# Patient Record
Sex: FEMALE | Race: WHITE | HISPANIC OR LATINO
[De-identification: names, ages, dates, MRNs, and addresses within clinical notes are randomized per-mention and may not be internally consistent; named-entity substitution may affect disease eponyms.]

---

## 2019-10-04 ENCOUNTER — APPOINTMENT (OUTPATIENT)
Dept: ENDOCRINOLOGY | Facility: CLINIC | Age: 69
End: 2019-10-04
Payer: MEDICARE

## 2019-10-04 VITALS
HEIGHT: 59 IN | BODY MASS INDEX: 29.03 KG/M2 | RESPIRATION RATE: 16 BRPM | WEIGHT: 144 LBS | DIASTOLIC BLOOD PRESSURE: 75 MMHG | HEART RATE: 96 BPM | OXYGEN SATURATION: 95 % | SYSTOLIC BLOOD PRESSURE: 123 MMHG

## 2019-10-04 LAB
GLUCOSE BLDC GLUCOMTR-MCNC: 405
HBA1C MFR BLD HPLC: >14

## 2019-10-04 PROCEDURE — 99204 OFFICE O/P NEW MOD 45 MIN: CPT | Mod: 25

## 2019-10-04 PROCEDURE — 83036 HEMOGLOBIN GLYCOSYLATED A1C: CPT | Mod: QW

## 2019-10-04 PROCEDURE — 82962 GLUCOSE BLOOD TEST: CPT

## 2019-10-04 NOTE — REVIEW OF SYSTEMS
[Fatigue] : fatigue [Visual Field Defect] : no visual field defect [Decreased Appetite] : appetite not decreased [Blurry Vision] : no blurred vision [Dysphagia] : no dysphagia [Dry Eyes] : no dryness of the eyes [Chest Pain] : no chest pain [Dysphonia] : no dysphonia [Palpitations] : no palpitations [Shortness Of Breath] : no shortness of breath [Wheezing] : no wheezing was heard [Cough] : no cough [Nausea] : no nausea [Vomiting] : no vomiting was observed [Constipation] : no constipation [Polyuria] : polyuria [Dysuria] : no dysuria [Incontinence] : no incontinence [Joint Pain] : no joint pain [Joint Stiffness] : no joint stiffness [Muscle Weakness] : no muscle weakness [Muscle Cramps] : no muscle cramps [Hair Loss] : no hair loss [Dry Skin] : no dry skin [Tremors] : no tremors [Headache] : no headaches [Dizziness] : no dizziness [Depression] : no depression [Anxiety] : no anxiety [Polydipsia] : polydipsia [Heat Intolerance] : heat tolerant [Cold Intolerance] : cold tolerant [Swelling] : no swelling [Lymphadenopathy] : no lymphadenopathy

## 2019-10-04 NOTE — HISTORY OF PRESENT ILLNESS
[FreeTextEntry1] : 69 year old female with PMH of type 2 DM (dx ) complicated by neuropathy, hypothyroidism, HTN, and HLD was referred for evaluation of uncontrolled type 2 DM. \par \par She reports weight loss of 100 lbs over the last 1.5 years secondary to dietary modifications. She was previously on insulin which was discontinued due to episodes of severe hypoglycemia following the weight loss. \par \par POC A1c in office>14%. \par \par Patient reports feeling well overall. Patient reports polyuria, polydipsia, and fatigue. She denies shortness of breath or lower extremity edema.\par \par Current DM regimen:\par Glipizide 5mg daily\par Xigduo 10mg,1000mg daily (started 1 year ago)\par \par SMBG: checks 2 times daily (fasting and bedtime). as per patient memory\par fastin-322\par bedtime: 400s\par \par FS in office: 400\par \par Hypoglycemia none \par Has hypoglycemic awareness none \par \par Diet:\par Breakfast: toast and coffee with sugar \par Lunch: vegetable shake, sometimes skips lunch, fruit smoothies with added sugar \par Dinner: vegetable and bean soup with bread or rice \par snacks: none  \par \par Exercise: walks daily\par \par Ophthalmology: last visit 10/2/19. no DR as per patient. \par Podiatry: + neuropathy, last visit 2 days ago\par Microalb: + 39, on lisinopril \par LDL on atorvastatin 20mg daily\par \par Family history: paternal uncles- DM\par \par On levothyroxine 100mcg PO daily for hypothyroidism. Recent TSH in 2019 2.3. \par \par Result of recent blood work 2019 reviewed:\par LDL 90\par TSH 2.3\par A1c >15.5%\par micro alb 38\par eGFR 72\par cr 0.82\par

## 2019-10-04 NOTE — CONSULT LETTER
[Dear  ___] : Dear  [unfilled], [FreeTextEntry1] : \par I had the pleasure of seeing your patient, LUDY HERNANDEZ, in consultation today for type 2 DM.\par I am including my consult note for your review. If I can be of any assistance, please do not hesitate to contact me. Thank you for allowing me to participate in the care of your patient.\par  \par Sincerely, \par \par Deanna Cheema M.D.\par \par

## 2019-10-04 NOTE — ASSESSMENT
[FreeTextEntry1] : 1. Uncontrolled type 2 DM with hyperglycemia\par -POC A1c >14%\par -Up to date with ophthalmology and podiatry follow up\par -microalbuminuria, on lisinopril\par -BP at goal \par -patient referred to diabetic educator and nutrition\par -change DM regimen as follows:\par       -Start Basaglar 12 units at bedtime \par       -Start Humalog 5 units TID with meals \par       -D/C glipizide 5mg daily\par       -Continue Xigduo\par -patient agreed to bring FS log to next visit \par 		---The following has been discussed:---\par -Targets for weight and HgA1c have been discussed with patient \par -FS goals have been reviewed with the patient in detail:\par AM <130 post meal<160-180\par -Diet and weight goals have been discussed with the patient in detail.\par -The importance of exercise in the treatment of diabetes has been discussed \par with the patient in detail.\par -Extensive dietary advice provided to patient and the need to avoid concentrated \par sweets/simple carbohydrates and to ensure to consume protein with each meal. \par -Patient instructed to limit carbohydrates to 60 gms per meal and 15 gms per \par snacks. \par -Patient to keep a blood sugar log to check fasting and before meals\par -Patient instructed on importance of daily feet inspection and to reports any \par open lesions to physician promptly\par \par \par 2. HLD\par -recent LDL 90\par -continue statin\par -counseled on low fat diet \par \par 3. HTN\par -BP at goal\par -continue lisinopril\par \par 4. Hypothyroidism\par -clinically and biochemically euthyroid\par -continue Levothyroxine 100mcg PO daily\par

## 2019-10-04 NOTE — PHYSICAL EXAM
[Alert] : alert [No Acute Distress] : no acute distress [Well Nourished] : well nourished [Well Developed] : well developed [Normal Sclera/Conjunctiva] : normal sclera/conjunctiva [EOMI] : extra ocular movement intact [No Proptosis] : no proptosis [Thyroid Not Enlarged] : the thyroid was not enlarged [Normal Oropharynx] : the oropharynx was normal [No Respiratory Distress] : no respiratory distress [No Thyroid Nodules] : there were no palpable thyroid nodules [No Accessory Muscle Use] : no accessory muscle use [Clear to Auscultation] : lungs were clear to auscultation bilaterally [Normal Rate] : heart rate was normal  [Normal S1, S2] : normal S1 and S2 [Regular Rhythm] : with a regular rhythm [Pedal Pulses Normal] : the pedal pulses are present [No Edema] : there was no peripheral edema [Normal Bowel Sounds] : normal bowel sounds [Not Tender] : non-tender [Soft] : abdomen soft [Not Distended] : not distended [No Spinal Tenderness] : no spinal tenderness [Spine Straight] : spine straight [No Stigmata of Cushings Syndrome] : no stigmata of cushings syndrome [Normal Gait] : normal gait [Normal Strength/Tone] : muscle strength and tone were normal [Acanthosis Nigricans] : no acanthosis nigricans [No Rash] : no rash [Right Foot Was Examined] : right foot ~C was examined [Left Foot Was Examined] : left foot ~C was examined [Normal] : normal [Normal Reflexes] : deep tendon reflexes were 2+ and symmetric [Diminished Throughout Both Feet] : normal tactile sensation with monofilament testing throughout both feet [No Tremors] : no tremors [Oriented x3] : oriented to person, place, and time

## 2019-11-07 ENCOUNTER — APPOINTMENT (OUTPATIENT)
Dept: ENDOCRINOLOGY | Facility: CLINIC | Age: 69
End: 2019-11-07

## 2021-04-12 ENCOUNTER — NON-APPOINTMENT (OUTPATIENT)
Age: 71
End: 2021-04-12

## 2021-04-12 ENCOUNTER — APPOINTMENT (OUTPATIENT)
Dept: CARDIOLOGY | Facility: CLINIC | Age: 71
End: 2021-04-12
Payer: MEDICARE

## 2021-04-12 VITALS
DIASTOLIC BLOOD PRESSURE: 80 MMHG | HEART RATE: 92 BPM | TEMPERATURE: 96.9 F | BODY MASS INDEX: 31.04 KG/M2 | HEIGHT: 59 IN | WEIGHT: 154 LBS | SYSTOLIC BLOOD PRESSURE: 137 MMHG | OXYGEN SATURATION: 94 %

## 2021-04-12 DIAGNOSIS — R00.2 PALPITATIONS: ICD-10-CM

## 2021-04-12 PROCEDURE — 93000 ELECTROCARDIOGRAM COMPLETE: CPT

## 2021-04-12 PROCEDURE — 99072 ADDL SUPL MATRL&STAF TM PHE: CPT

## 2021-04-12 PROCEDURE — 99204 OFFICE O/P NEW MOD 45 MIN: CPT

## 2021-04-12 RX ORDER — ALBUTEROL SULFATE 4 MG/1
TABLET ORAL
Refills: 0 | Status: DISCONTINUED | COMMUNITY
End: 2021-04-12

## 2021-04-12 RX ORDER — PENTOXIFYLLINE 400 MG/1
400 TABLET, EXTENDED RELEASE ORAL
Refills: 0 | Status: DISCONTINUED | COMMUNITY
End: 2021-04-12

## 2021-04-12 RX ORDER — DAPAGLIFLOZIN AND METFORMIN HYDROCHLORIDE 10; 1000 MG/1; MG/1
10-1000 TABLET, FILM COATED, EXTENDED RELEASE ORAL
Refills: 0 | Status: DISCONTINUED | COMMUNITY
End: 2021-04-12

## 2021-04-12 RX ORDER — GLIPIZIDE 5 MG/1
5 TABLET ORAL
Refills: 0 | Status: DISCONTINUED | COMMUNITY
End: 2021-04-12

## 2021-04-12 RX ORDER — AZITHROMYCIN 250 MG/1
250 TABLET, FILM COATED ORAL
Qty: 6 | Refills: 0 | Status: DISCONTINUED | COMMUNITY
Start: 2021-02-04 | End: 2021-04-12

## 2021-04-12 NOTE — PHYSICAL EXAM
[General Appearance - Well Developed] : well developed [Normal Appearance] : normal appearance [Well Groomed] : well groomed [General Appearance - Well Nourished] : well nourished [No Deformities] : no deformities [General Appearance - In No Acute Distress] : no acute distress [Normal Conjunctiva] : the conjunctiva exhibited no abnormalities [Eyelids - No Xanthelasma] : the eyelids demonstrated no xanthelasmas [Normal Oral Mucosa] : normal oral mucosa [No Oral Pallor] : no oral pallor [No Oral Cyanosis] : no oral cyanosis [Normal Jugular Venous A Waves Present] : normal jugular venous A waves present [Normal Jugular Venous V Waves Present] : normal jugular venous V waves present [No Jugular Venous Prescott A Waves] : no jugular venous prescott A waves [Heart Rate And Rhythm] : heart rate and rhythm were normal [Heart Sounds] : normal S1 and S2 [Murmurs] : no murmurs present [Respiration, Rhythm And Depth] : normal respiratory rhythm and effort [Exaggerated Use Of Accessory Muscles For Inspiration] : no accessory muscle use [Auscultation Breath Sounds / Voice Sounds] : lungs were clear to auscultation bilaterally [Abdomen Soft] : soft [Abdomen Tenderness] : non-tender [Abdomen Mass (___ Cm)] : no abdominal mass palpated [Abnormal Walk] : normal gait [Gait - Sufficient For Exercise Testing] : the gait was sufficient for exercise testing [Nail Clubbing] : no clubbing of the fingernails [Cyanosis, Localized] : no localized cyanosis [Petechial Hemorrhages (___cm)] : no petechial hemorrhages [Skin Color & Pigmentation] : normal skin color and pigmentation [] : no rash [No Venous Stasis] : no venous stasis [Skin Lesions] : no skin lesions [No Skin Ulcers] : no skin ulcer [No Xanthoma] : no  xanthoma was observed [Oriented To Time, Place, And Person] : oriented to person, place, and time [Affect] : the affect was normal [Mood] : the mood was normal [No Anxiety] : not feeling anxious

## 2021-04-12 NOTE — HISTORY OF PRESENT ILLNESS
[FreeTextEntry1] : Shruthi is a 70-year-old Lebanese speaking female DM, htn, hyperlipidemia, s/p COVID with dyspnea and palpitations on minimal exertion that has not improved. She is unable to do normal housework.

## 2021-04-12 NOTE — REVIEW OF SYSTEMS
[Shortness Of Breath] : shortness of breath [Dyspnea on exertion] : dyspnea during exertion [Palpitations] : palpitations [Negative] : Heme/Lymph [Chest Pain] : no chest pain [Lower Ext Edema] : no extremity edema

## 2021-04-12 NOTE — DISCUSSION/SUMMARY
[FreeTextEntry1] : The patient is a 70-year-old Cam[place]onian speaking female DM, hypertension, hyperlipidemia, s/p COVID with dyspnea and palpitations. \par #1 CV- normal ECG, echo and event monitor\par #2 Htn- continue diltiazem, lisinopril/HCTZ\par #3 Lipids- continue atorvastatin and fenofibrate\par #4 DM- continue januvia, and metformin\par #5 Hypothyroid- continue levothyroxine\par #6 General- encouraged to walk daily and use inspirometer, daughter supportive

## 2021-04-22 ENCOUNTER — APPOINTMENT (OUTPATIENT)
Dept: INTERNAL MEDICINE | Facility: CLINIC | Age: 71
End: 2021-04-22
Payer: MEDICARE

## 2021-04-22 VITALS
BODY MASS INDEX: 31.04 KG/M2 | DIASTOLIC BLOOD PRESSURE: 87 MMHG | TEMPERATURE: 96.4 F | HEART RATE: 101 BPM | HEIGHT: 59 IN | SYSTOLIC BLOOD PRESSURE: 152 MMHG | WEIGHT: 154 LBS | OXYGEN SATURATION: 94 % | RESPIRATION RATE: 16 BRPM

## 2021-04-22 PROCEDURE — 99072 ADDL SUPL MATRL&STAF TM PHE: CPT

## 2021-04-22 PROCEDURE — 99387 INIT PM E/M NEW PAT 65+ YRS: CPT

## 2021-04-22 PROCEDURE — 99213 OFFICE O/P EST LOW 20 MIN: CPT

## 2021-04-22 NOTE — ASSESSMENT
[FreeTextEntry1] : INITIAL CPE OF 70 Y OLD FEM WITH PMX OF DM ,DYSLIPIDEMIA ,HTN ,ASTHMA AND HYPOTHYROID= REFER TO SEE ENDO FOR POORLY CONTROL DM ;ADVISED TO RESUME 5 UNITS PREMEAL SHORT ACTING INSULIN TILL SEEN BY ENDO \par REFER TO SEE PULM FOR POST COVID-19 DYSPNEA\par RTO IN 3-4 M \par F/U CARDIOLOGY

## 2021-04-22 NOTE — PHYSICAL EXAM
[No Acute Distress] : no acute distress [Well Nourished] : well nourished [Well Developed] : well developed [Well-Appearing] : well-appearing [Normal Sclera/Conjunctiva] : normal sclera/conjunctiva [PERRL] : pupils equal round and reactive to light [EOMI] : extraocular movements intact [No JVD] : no jugular venous distention [No Lymphadenopathy] : no lymphadenopathy [Supple] : supple [Thyroid Normal, No Nodules] : the thyroid was normal and there were no nodules present [No Respiratory Distress] : no respiratory distress  [No Accessory Muscle Use] : no accessory muscle use [Decreased Breath Sounds] : breath sounds were decreased diffusely [Normal Rate] : normal rate  [Regular Rhythm] : with a regular rhythm [Normal S1, S2] : normal S1 and S2 [No Murmur] : no murmur heard [No Carotid Bruits] : no carotid bruits [No Abdominal Bruit] : a ~M bruit was not heard ~T in the abdomen [No Varicosities] : no varicosities [Pedal Pulses Present] : the pedal pulses are present [No Edema] : there was no peripheral edema [No Palpable Aorta] : no palpable aorta [No Extremity Clubbing/Cyanosis] : no extremity clubbing/cyanosis [Soft] : abdomen soft [Non Tender] : non-tender [Non-distended] : non-distended [No Masses] : no abdominal mass palpated [No HSM] : no HSM [Normal Bowel Sounds] : normal bowel sounds [Obese] : obese [Normal Posterior Cervical Nodes] : no posterior cervical lymphadenopathy [Normal Anterior Cervical Nodes] : no anterior cervical lymphadenopathy [No CVA Tenderness] : no CVA  tenderness [No Spinal Tenderness] : no spinal tenderness [No Joint Swelling] : no joint swelling [Grossly Normal Strength/Tone] : grossly normal strength/tone [No Rash] : no rash [Coordination Grossly Intact] : coordination grossly intact [No Focal Deficits] : no focal deficits [Normal Affect] : the affect was normal [Normal Insight/Judgement] : insight and judgment were intact

## 2021-04-22 NOTE — REVIEW OF SYSTEMS
[Shortness Of Breath] : shortness of breath [Dyspnea on Exertion] : dyspnea on exertion [Joint Pain] : joint pain [Joint Stiffness] : joint stiffness [Muscle Pain] : muscle pain [Negative] : Heme/Lymph

## 2021-04-23 ENCOUNTER — APPOINTMENT (OUTPATIENT)
Dept: RHEUMATOLOGY | Facility: CLINIC | Age: 71
End: 2021-04-23
Payer: MEDICARE

## 2021-04-23 VITALS
DIASTOLIC BLOOD PRESSURE: 76 MMHG | BODY MASS INDEX: 31.25 KG/M2 | SYSTOLIC BLOOD PRESSURE: 132 MMHG | HEIGHT: 59 IN | WEIGHT: 155 LBS | RESPIRATION RATE: 17 BRPM | OXYGEN SATURATION: 95 % | TEMPERATURE: 96.7 F | HEART RATE: 90 BPM

## 2021-04-23 PROCEDURE — 99072 ADDL SUPL MATRL&STAF TM PHE: CPT

## 2021-04-23 PROCEDURE — 99205 OFFICE O/P NEW HI 60 MIN: CPT

## 2021-05-11 ENCOUNTER — APPOINTMENT (OUTPATIENT)
Dept: ENDOCRINOLOGY | Facility: CLINIC | Age: 71
End: 2021-05-11
Payer: MEDICARE

## 2021-05-11 VITALS — BODY MASS INDEX: 30.84 KG/M2 | HEIGHT: 59 IN | WEIGHT: 153 LBS

## 2021-05-11 PROCEDURE — 99072 ADDL SUPL MATRL&STAF TM PHE: CPT

## 2021-05-11 PROCEDURE — 95251 CONT GLUC MNTR ANALYSIS I&R: CPT

## 2021-05-11 PROCEDURE — G0108 DIAB MANAGE TRN  PER INDIV: CPT

## 2021-05-11 PROCEDURE — 95250 CONT GLUC MNTR PHYS/QHP EQP: CPT

## 2021-05-24 ENCOUNTER — APPOINTMENT (OUTPATIENT)
Dept: PULMONOLOGY | Facility: CLINIC | Age: 71
End: 2021-05-24
Payer: MEDICARE

## 2021-05-24 ENCOUNTER — APPOINTMENT (OUTPATIENT)
Dept: CARDIOLOGY | Facility: CLINIC | Age: 71
End: 2021-05-24
Payer: MEDICARE

## 2021-05-24 VITALS
HEART RATE: 85 BPM | TEMPERATURE: 97.1 F | DIASTOLIC BLOOD PRESSURE: 80 MMHG | BODY MASS INDEX: 31.25 KG/M2 | SYSTOLIC BLOOD PRESSURE: 130 MMHG | OXYGEN SATURATION: 95 % | HEIGHT: 59 IN | RESPIRATION RATE: 12 BRPM | WEIGHT: 155 LBS

## 2021-05-24 PROCEDURE — 93306 TTE W/DOPPLER COMPLETE: CPT

## 2021-05-24 PROCEDURE — 99204 OFFICE O/P NEW MOD 45 MIN: CPT

## 2021-05-24 NOTE — HISTORY OF PRESENT ILLNESS
[Never] : never [Former] : former [TextBox_4] : Patient is a 71-year-old obese female with past medical history significant for asthma, diabetes status post COVID-19 pneumonia who presents for pulmonary consult.  The patient was never admitted to the hospital.  She currently complains of cough shortness of breath and dyspnea on exertion despite using her medications.  In discussion with the patient's daughter the patient has been using her maintenance medicine as a rescue inhaler

## 2021-05-24 NOTE — REASON FOR VISIT
[Consultation] : a consultation [Asthma] : asthma [Cough] : cough [Shortness of Breath] : shortness of breath [Family Member] : family member [TextBox_44] : Status post COVID-19 pneumonia

## 2021-05-24 NOTE — ASSESSMENT
[FreeTextEntry1] : Summary the patient is a 71-year-old female with past medical history significant for diabetes, asthma status post COVID-19 pneumonia who presents today for pulmonary consult.  The patient's physical exam is significant for good air entry bilaterally.  I had a lengthy discussion with the patient and her daughter.  The patient has been using her maintenance medication and properly.  I instructed her and her daughter on the proper use of her maintenance and rescue medications.  Prescriptions have been sent.  An interleukin-6 level as well as a D-dimer are also being sent.  If the patient's D-dimer is positive I will start her on anticoagulation.  In the meantime a pulmonary function test as well as a cardiopulmonary stress test have been ordered.  The patient will follow up in 2 weeks

## 2021-05-24 NOTE — REVIEW OF SYSTEMS
[Fever] : no fever [Fatigue] : fatigue [Chills] : no chills [Cough] : cough [Hemoptysis] : no hemoptysis [Chest Tightness] : chest tightness [Frequent URIs] : no frequent URIs [Sputum] : no sputum [Dyspnea] : dyspnea [Pleuritic Pain] : no pleuritic pain [Wheezing] : wheezing [A.M. Dry Mouth] : a.m. dry mouth [SOB on Exertion] : sob on exertion [Negative] : Endocrine

## 2021-05-27 LAB
COVID-19 NUCLEOCAPSID  GAM ANTIBODY INTERPRETATION: POSITIVE
IL6 SERPL-MCNC: <2.5 PG/ML
SARS-COV-2 AB SERPL QL IA: 187 INDEX

## 2021-06-07 ENCOUNTER — APPOINTMENT (OUTPATIENT)
Dept: PULMONOLOGY | Facility: CLINIC | Age: 71
End: 2021-06-07
Payer: MEDICARE

## 2021-06-07 VITALS
HEIGHT: 59 IN | SYSTOLIC BLOOD PRESSURE: 143 MMHG | BODY MASS INDEX: 31.85 KG/M2 | HEART RATE: 74 BPM | DIASTOLIC BLOOD PRESSURE: 84 MMHG | WEIGHT: 158 LBS | TEMPERATURE: 97.1 F | OXYGEN SATURATION: 95 % | RESPIRATION RATE: 14 BRPM

## 2021-06-07 DIAGNOSIS — R06.83 SNORING: ICD-10-CM

## 2021-06-07 PROCEDURE — 94060 EVALUATION OF WHEEZING: CPT

## 2021-06-07 PROCEDURE — 94729 DIFFUSING CAPACITY: CPT

## 2021-06-07 PROCEDURE — 99214 OFFICE O/P EST MOD 30 MIN: CPT | Mod: 25

## 2021-06-07 PROCEDURE — 94726 PLETHYSMOGRAPHY LUNG VOLUMES: CPT

## 2021-06-07 NOTE — REASON FOR VISIT
[Follow-Up] : a follow-up visit [Shortness of Breath] : shortness of breath [Family Member] : family member

## 2021-06-07 NOTE — PHYSICAL EXAM
[No Acute Distress] : no acute distress [Normal Oropharynx] : normal oropharynx [III] : Mallampati Class: III [Normal Appearance] : normal appearance [No Neck Mass] : no neck mass [Normal Rate/Rhythm] : normal rate/rhythm [Normal S1, S2] : normal s1, s2 [No Murmurs] : no murmurs [No Resp Distress] : no resp distress [Clear to Auscultation Bilaterally] : clear to auscultation bilaterally [Benign] : benign [No Abnormalities] : no abnormalities [Normal Gait] : normal gait [No Clubbing] : no clubbing [No Cyanosis] : no cyanosis [No Edema] : no edema [FROM] : FROM [Normal Color/ Pigmentation] : normal color/ pigmentation [No Focal Deficits] : no focal deficits [Oriented x3] : oriented x3 [Normal Affect] : normal affect

## 2021-06-07 NOTE — HISTORY OF PRESENT ILLNESS
[Former] : former [Never] : never [TextBox_4] : Patient is a 71-year-old obese female with past medical history significant for post COVID-19 pneumonia, history of asthma, hypertension, high cholesterol, obesity, diabetes who presents today for further management.  The patient complains of cough shortness of breath and dyspnea on exertion.  She states that she is winded after minimal movements and/or exercise.  She also complains of nonrestorative sleep and morning headaches.  She denies fevers chills chest pain weight loss or hemoptysis

## 2021-06-07 NOTE — REVIEW OF SYSTEMS
[Fever] : no fever [Fatigue] : fatigue [Chills] : no chills [Cough] : cough [Hemoptysis] : no hemoptysis [Chest Tightness] : chest tightness [Frequent URIs] : no frequent URIs [Sputum] : no sputum [Dyspnea] : dyspnea [Pleuritic Pain] : no pleuritic pain [Wheezing] : no wheezing [A.M. Dry Mouth] : a.m. dry mouth [SOB on Exertion] : sob on exertion [Negative] : Endocrine

## 2021-06-07 NOTE — ASSESSMENT
[FreeTextEntry1] : In summary the patient is a 71-year-old obese female with past medical history significant for hypertension, high cholesterol, diabetes, status post COVID-19 pneumonia, hypothyroidism who presents today for follow-up.  The patient's history and physical is consistent with restrictive lung disease and obstructive sleep apnea.  Her physical exam is significant for Mallampati score of 3.  The patient underwent a pulmonary function test which revealed severe restrictive lung disease.  A home sleep monitor has been ordered.\par \par I had a lengthy discussion with the patient and her family member.  The patient will attempt a diet and will follow-up in 3 months

## 2021-06-30 ENCOUNTER — APPOINTMENT (OUTPATIENT)
Dept: UROLOGY | Facility: CLINIC | Age: 71
End: 2021-06-30
Payer: MEDICARE

## 2021-06-30 VITALS
HEART RATE: 81 BPM | SYSTOLIC BLOOD PRESSURE: 121 MMHG | HEIGHT: 59 IN | DIASTOLIC BLOOD PRESSURE: 59 MMHG | WEIGHT: 159 LBS | BODY MASS INDEX: 32.05 KG/M2 | OXYGEN SATURATION: 95 %

## 2021-06-30 DIAGNOSIS — N39.3 STRESS INCONTINENCE (FEMALE) (MALE): ICD-10-CM

## 2021-06-30 DIAGNOSIS — N39.0 URINARY TRACT INFECTION, SITE NOT SPECIFIED: ICD-10-CM

## 2021-06-30 PROCEDURE — 99205 OFFICE O/P NEW HI 60 MIN: CPT

## 2021-06-30 PROCEDURE — 51798 US URINE CAPACITY MEASURE: CPT

## 2021-06-30 NOTE — REVIEW OF SYSTEMS
[Dry Eyes] : dryness of the eyes [Shortness Of Breath] : shortness of breath [Pain during urination] : pain during urination [Wake up at night to urinate  How many times?  ___] : wakes up to urinate [unfilled] times during the night [Strong urge to urinate] : strong urge to urinate [Bladder pressure] : experiences bladder pressure [Leakage of urine with straining, coughing, laughing] : leakage of urine with straining, coughing, laughing [Joint Pain] : joint pain [Negative] : Heme/Lymph [FreeTextEntry3] : Frequent urination

## 2021-07-07 LAB
APPEARANCE: CLEAR
BACTERIA UR CULT: NORMAL
BACTERIA: NEGATIVE
BILIRUBIN URINE: NEGATIVE
BLOOD URINE: NEGATIVE
CALCIUM OXALATE CRYSTALS: ABNORMAL
COLOR: YELLOW
GLUCOSE QUALITATIVE U: NEGATIVE
HYALINE CASTS: 0 /LPF
KETONES URINE: NEGATIVE
LEUKOCYTE ESTERASE URINE: NEGATIVE
MICROSCOPIC-UA: NORMAL
NITRITE URINE: NEGATIVE
PH URINE: 6.5
PROTEIN URINE: ABNORMAL
RED BLOOD CELLS URINE: 1 /HPF
SPECIFIC GRAVITY URINE: 1.02
SQUAMOUS EPITHELIAL CELLS: 2 /HPF
UROBILINOGEN URINE: NORMAL
WHITE BLOOD CELLS URINE: 2 /HPF

## 2021-07-12 ENCOUNTER — APPOINTMENT (OUTPATIENT)
Dept: COLORECTAL SURGERY | Facility: CLINIC | Age: 71
End: 2021-07-12
Payer: MEDICARE

## 2021-07-12 VITALS
HEART RATE: 61 BPM | DIASTOLIC BLOOD PRESSURE: 82 MMHG | TEMPERATURE: 97.8 F | SYSTOLIC BLOOD PRESSURE: 132 MMHG | OXYGEN SATURATION: 98 %

## 2021-07-12 DIAGNOSIS — Z87.891 PERSONAL HISTORY OF NICOTINE DEPENDENCE: ICD-10-CM

## 2021-07-12 PROCEDURE — 99203 OFFICE O/P NEW LOW 30 MIN: CPT | Mod: 25

## 2021-07-12 PROCEDURE — 46600 DIAGNOSTIC ANOSCOPY SPX: CPT

## 2021-07-12 NOTE — HISTORY OF PRESENT ILLNESS
[FreeTextEntry1] : 71-year-old female with a history of hypertension diabetes and high cholesterol presents for painful irritated hemorrhoids.  Her last colonoscopy was in 2020

## 2021-07-12 NOTE — ASSESSMENT
[FreeTextEntry1] : 71-year-old female with Mild internal hemorrhoidal disease, healed posterior midline fissure.

## 2021-07-12 NOTE — PHYSICAL EXAM
[Abdomen Masses] : No abdominal masses [Normal rectal exam] : exam was normal [JVD] : no jugular venous distention  [Normal Breath Sounds] : Normal breath sounds [Wheezing] : no wheezing was heard [Normal Heart Sounds] : normal heart sounds [Normal Rate and Rhythm] : normal rate and rhythm [No Rash or Lesion] : No rash or lesion [Purpura] : no purpura  [Petechiae] : no petechiae [Skin Ulcer] : no ulcer [Skin Induration] : no induration [Alert] : alert [Oriented to Person] : oriented to person [Oriented to Place] : oriented to place [Oriented to Time] : oriented to time [Calm] : calm [de-identified] : benign Abdomen [de-identified] : Anoscopy reveals [de-identified] : NAD [de-identified] : LESTER [de-identified] : FROM

## 2021-07-12 NOTE — PROCEDURE
[FreeTextEntry1] : Anoscopy Reveals a posterior midline healed fissure, mild swollen internal hemorrhoids mild external hemorrhoids as well.

## 2021-07-23 ENCOUNTER — NON-APPOINTMENT (OUTPATIENT)
Age: 71
End: 2021-07-23

## 2021-08-02 ENCOUNTER — APPOINTMENT (OUTPATIENT)
Dept: RHEUMATOLOGY | Facility: CLINIC | Age: 71
End: 2021-08-02

## 2021-08-16 ENCOUNTER — APPOINTMENT (OUTPATIENT)
Dept: COLORECTAL SURGERY | Facility: CLINIC | Age: 71
End: 2021-08-16
Payer: MEDICARE

## 2021-08-16 PROCEDURE — 46600 DIAGNOSTIC ANOSCOPY SPX: CPT

## 2021-08-16 PROCEDURE — 99212 OFFICE O/P EST SF 10 MIN: CPT | Mod: 25

## 2021-08-16 NOTE — PHYSICAL EXAM
[Normal rectal exam] : exam was normal [Posterior] : posteriorly [Excoriation] : no perianal excoriation [Multiple Sinus Tracts] : no perianal sinus tracts [Fistula] : no fistulas [Wart] : no warts [Ulcer ___ cm] : no ulcers [Pilonidal Cyst] : no pilonidal cysts [Pilonidal Sinus] : no pilonidal sinus [Pilonidal Sinus Draining] : no pilonidal sinus drainage [Tender, Swollen] : nontender, non-swollen [Lax] : was lax [Normal Breath Sounds] : Normal breath sounds [No Rash or Lesion] : No rash or lesion [Wheezing] : no wheezing was heard [Purpura] : no purpura  [Petechiae] : no petechiae [Skin Ulcer] : no ulcer [Skin Induration] : no induration [Oriented to Person] : oriented to person [Alert] : alert [Oriented to Place] : oriented to place [Oriented to Time] : oriented to time [Calm] : calm [de-identified] : Benign [de-identified] : Anoscopy reveals noninflamed hemorrhoids mild mucosal prolapse and well healed posterior midline fissure. [de-identified] : Healed [de-identified] : No apparent distress

## 2021-08-16 NOTE — ASSESSMENT
[FreeTextEntry1] : 71-year-old female with improved symptoms after supplementing fiber and instituting sitz bath 3 times daily with ongoing symptoms of pruritis ani.

## 2021-08-16 NOTE — HISTORY OF PRESENT ILLNESS
[FreeTextEntry1] : 70 year-old female here for followup appointment after being seen last month for perianal irritation found to have a healed posterior midline fissure and some prolapsing mucosal tissue and mildly inflamed hemorrhoids. All creams were stopped and the patient was placed on a regimen of fiber supplementation and sitz baths which resulted in significant improvement in her symptoms. She does complain of ongoing itching. Of note she does consume a significant amount of caffeine and other dietary items that are consistent with Pruritus ani a.

## 2021-08-24 ENCOUNTER — APPOINTMENT (OUTPATIENT)
Dept: INTERNAL MEDICINE | Facility: CLINIC | Age: 71
End: 2021-08-24
Payer: MEDICARE

## 2021-08-24 VITALS
RESPIRATION RATE: 12 BRPM | DIASTOLIC BLOOD PRESSURE: 82 MMHG | OXYGEN SATURATION: 97 % | HEART RATE: 88 BPM | HEIGHT: 59 IN | WEIGHT: 150 LBS | TEMPERATURE: 96.9 F | BODY MASS INDEX: 30.24 KG/M2 | SYSTOLIC BLOOD PRESSURE: 125 MMHG

## 2021-08-24 PROCEDURE — 99214 OFFICE O/P EST MOD 30 MIN: CPT

## 2021-08-24 RX ORDER — NITROFURANTOIN MACROCRYSTALS 100 MG/1
100 CAPSULE ORAL
Qty: 30 | Refills: 3 | Status: DISCONTINUED | COMMUNITY
Start: 2021-06-30 | End: 2021-08-24

## 2021-08-24 NOTE — ASSESSMENT
[FreeTextEntry1] : 71 Y OLD FEM WITH PMX OF HYPOTHYROID= LEVOTHYROXINE RX\par DM = AS PER ENDO ;HAD LABS 7/21 AND WILL F/U 10/21\par ASTHMA = MONTELUKAST RX SENT ,F/U PULM \par HTN AND DYSLIPIDEMIA = F/U CARDIO\par MAMMO ORDERED\par RTO 3-4 M

## 2021-09-08 ENCOUNTER — APPOINTMENT (OUTPATIENT)
Dept: PULMONOLOGY | Facility: CLINIC | Age: 71
End: 2021-09-08
Payer: MEDICARE

## 2021-09-08 VITALS
SYSTOLIC BLOOD PRESSURE: 142 MMHG | OXYGEN SATURATION: 97 % | RESPIRATION RATE: 16 BRPM | HEIGHT: 59 IN | HEART RATE: 91 BPM | TEMPERATURE: 97.3 F | DIASTOLIC BLOOD PRESSURE: 83 MMHG

## 2021-09-08 PROCEDURE — 99214 OFFICE O/P EST MOD 30 MIN: CPT

## 2021-09-08 NOTE — HISTORY OF PRESENT ILLNESS
[TextBox_4] : Patient is a 71-year-old female with past medical history significant for asthma who is status post COVID-19 pneumonia and presents today for follow-up.  The patient complains of a nonproductive cough that is intermittent in nature and embarrassing.  Currently denies any fevers chills chest pain weight loss or hemoptysis

## 2021-09-08 NOTE — ASSESSMENT
[FreeTextEntry1] : Summary the patient is a 71-year-old female with past medical history significant for intermittent asthma who is status post COVID-19 pneumonia and presents today for follow-up.  The patient continues to complain of a cough.  I instructed her on the proper use of her rescue inhaler.  She is currently started on Trelegy and instructed to follow-up in 2 weeks

## 2021-09-08 NOTE — REASON FOR VISIT
[Follow-Up] : a follow-up visit [Asthma] : asthma [Cough] : cough [Family Member] : family member [TextBox_44] : Post COVID-19

## 2021-09-08 NOTE — PHYSICAL EXAM
[No Acute Distress] : no acute distress [Normal Oropharynx] : normal oropharynx [III] : Mallampati Class: III [Normal Appearance] : normal appearance [No Neck Mass] : no neck mass [Normal Rate/Rhythm] : normal rate/rhythm [Normal S1, S2] : normal s1, s2 [No Murmurs] : no murmurs [No Resp Distress] : no resp distress [Clear to Auscultation Bilaterally] : clear to auscultation bilaterally [No Abnormalities] : no abnormalities [Benign] : benign [Normal Gait] : normal gait [No Clubbing] : no clubbing [No Edema] : no edema [No Cyanosis] : no cyanosis [FROM] : FROM [Normal Color/ Pigmentation] : normal color/ pigmentation [No Focal Deficits] : no focal deficits [Oriented x3] : oriented x3 [Normal Affect] : normal affect

## 2021-09-22 ENCOUNTER — RX RENEWAL (OUTPATIENT)
Age: 71
End: 2021-09-22

## 2021-09-22 ENCOUNTER — APPOINTMENT (OUTPATIENT)
Dept: PULMONOLOGY | Facility: CLINIC | Age: 71
End: 2021-09-22
Payer: MEDICARE

## 2021-09-22 VITALS
OXYGEN SATURATION: 92 % | SYSTOLIC BLOOD PRESSURE: 130 MMHG | HEIGHT: 59 IN | RESPIRATION RATE: 12 BRPM | DIASTOLIC BLOOD PRESSURE: 80 MMHG | TEMPERATURE: 97.3 F | HEART RATE: 79 BPM

## 2021-09-22 PROCEDURE — 99214 OFFICE O/P EST MOD 30 MIN: CPT

## 2021-10-04 ENCOUNTER — APPOINTMENT (OUTPATIENT)
Dept: COLORECTAL SURGERY | Facility: CLINIC | Age: 71
End: 2021-10-04
Payer: MEDICARE

## 2021-10-04 DIAGNOSIS — L29.0 PRURITUS ANI: ICD-10-CM

## 2021-10-04 DIAGNOSIS — K64.4 RESIDUAL HEMORRHOIDAL SKIN TAGS: ICD-10-CM

## 2021-10-04 DIAGNOSIS — K64.8 OTHER HEMORRHOIDS: ICD-10-CM

## 2021-10-04 PROCEDURE — 46600 DIAGNOSTIC ANOSCOPY SPX: CPT

## 2021-10-04 PROCEDURE — 99212 OFFICE O/P EST SF 10 MIN: CPT | Mod: 25

## 2021-10-04 NOTE — PHYSICAL EXAM
[Normal rectal exam] : exam was normal [Anterior] : anteriorly [Excoriation] : no perianal excoriation [Multiple Sinus Tracts] : no perianal sinus tracts [Fistula] : no fistulas [Wart] : no warts [Ulcer ___ cm] : no ulcers [Pilonidal Cyst] : no pilonidal cysts [Pilonidal Sinus] : no pilonidal sinus [Pilonidal Sinus Draining] : no pilonidal sinus drainage [Tender, Swollen] : tender, swollen [Nonprolapsing] : a nonprolapsing (grade I) [Skin Tags] : residual hemorrhoidal skin tags were noted [Normal] : was normal [JVD] : no jugular venous distention  [Normal Breath Sounds] : Normal breath sounds [Wheezing] : no wheezing was heard [Normal Heart Sounds] : normal heart sounds [Normal Rate and Rhythm] : normal rate and rhythm [No Rash or Lesion] : No rash or lesion [Purpura] : no purpura  [Petechiae] : no petechiae [Skin Ulcer] : no ulcer [Skin Induration] : no induration [Alert] : alert [Oriented to Person] : oriented to person [Oriented to Place] : oriented to place [Oriented to Time] : oriented to time [Calm] : calm [de-identified] : Benign [de-identified] : Anoscopy reveals a mildly swollen right anterior hemorrhoid as well as a healed anterior fissure. [de-identified] : Well-healed [de-identified] : No apparent distress [de-identified] : Pupils equal round and reactive to light and accommodation [de-identified] : Full range of motion

## 2021-10-04 NOTE — ASSESSMENT
[FreeTextEntry1] : 70-year-old female with irritated anterior internal hemorrhoid, irritable bowel syndrome next picture, and pruritus.

## 2021-10-04 NOTE — HISTORY OF PRESENT ILLNESS
[FreeTextEntry1] : 71-year-old female presented to the office for followup complaining of symptoms of intermittent urgency after meals and constipation in the morning. She also complains of frequent itching. She is unsure if that she had she drinks one regular basis contains caffeine.

## 2021-10-04 NOTE — ASSESSMENT
[FreeTextEntry1] : Summary the patient is a 71-year-old female with past medical history significant for intermittent asthma who is status post COVID-19 pneumonia and presents today for follow-up.  The patient continues to complain of a cough.  I instructed her on the proper use of her rescue inhaler.  She is currently  on Trelegy and instructed to follow-up in 1 month

## 2021-10-04 NOTE — HISTORY OF PRESENT ILLNESS
[Former] : former [Never] : never [TextBox_4] : Patient is a 71-year-old female with past medical history significant for GERD status post COVID-19, high cholesterol hypertension hypothyroidism who presents today for follow-up.  The patient complains of a nonproductive cough that has increased in frequency.  She currently denies any recent fevers chills chest pain weight loss or hemoptysis

## 2021-10-20 ENCOUNTER — APPOINTMENT (OUTPATIENT)
Dept: COLORECTAL SURGERY | Facility: CLINIC | Age: 71
End: 2021-10-20
Payer: MEDICARE

## 2021-10-20 PROCEDURE — 99212 OFFICE O/P EST SF 10 MIN: CPT | Mod: 25

## 2021-10-20 PROCEDURE — 46600 DIAGNOSTIC ANOSCOPY SPX: CPT

## 2021-10-20 NOTE — HISTORY OF PRESENT ILLNESS
[FreeTextEntry1] : 71 year-old female here for followup regarding her anal pruritus. She has markedly symptomatic improvement with increased fiber, MiraLax and hydrocortisone cream as needed.

## 2021-10-20 NOTE — ASSESSMENT
[FreeTextEntry1] : 71-year-old female with improved anal pruritus with dietary modification and fiber supplementation.

## 2021-10-20 NOTE — PHYSICAL EXAM
[de-identified] :  healed fissure noted on anoscopy unchanged, No hemorrhoidal swelling or irritation

## 2021-10-25 ENCOUNTER — APPOINTMENT (OUTPATIENT)
Dept: PULMONOLOGY | Facility: CLINIC | Age: 71
End: 2021-10-25
Payer: MEDICARE

## 2021-10-25 VITALS
HEIGHT: 59 IN | DIASTOLIC BLOOD PRESSURE: 77 MMHG | OXYGEN SATURATION: 95 % | HEART RATE: 95 BPM | TEMPERATURE: 96.9 F | BODY MASS INDEX: 31.45 KG/M2 | RESPIRATION RATE: 12 BRPM | SYSTOLIC BLOOD PRESSURE: 164 MMHG | WEIGHT: 156 LBS

## 2021-10-25 DIAGNOSIS — Z23 ENCOUNTER FOR IMMUNIZATION: ICD-10-CM

## 2021-10-25 PROCEDURE — 99214 OFFICE O/P EST MOD 30 MIN: CPT | Mod: 25

## 2021-10-25 PROCEDURE — G0008: CPT

## 2021-10-25 PROCEDURE — 90662 IIV NO PRSV INCREASED AG IM: CPT

## 2021-10-25 NOTE — REASON FOR VISIT
[Follow-Up] : a follow-up visit [Asthma] : asthma [Cough] : cough [Shortness of Breath] : shortness of breath [Wheezing] : wheezing [Family Member] : family member

## 2021-10-25 NOTE — REVIEW OF SYSTEMS
Evelyn     I sent in new style of pill that is 24 days of lower dose hormone with only 4 days off, to see if it was drug or style issue (since both are different).     Thanks,   Dr. Rosales   [Negative] : Endocrine

## 2021-10-25 NOTE — HISTORY OF PRESENT ILLNESS
[Never] : never [Former] : former [TextBox_4] : Patient is a 71-year-old female past medical history significant for hypertension, hypothyroid, high cholesterol, asthma who presents today for follow-up.  The patient's complaint of cough shortness of breath has improved.  She currently denies fevers chills chest pain weight loss or hemoptysis.  She complains of occasional fatigue and dyspnea on exertion

## 2021-10-25 NOTE — ASSESSMENT
[FreeTextEntry1] : Summary the patient is a 71-year-old female with past medical history significant for intermittent asthma who is status post COVID-19 pneumonia and presents today for follow-up.  Patient's physical exam is significant for improved air entry bilaterally.  The patient is instructed to continue current therapies, prescription renewal performed and to follow-up in 3 months

## 2021-10-26 ENCOUNTER — LABORATORY RESULT (OUTPATIENT)
Age: 71
End: 2021-10-26

## 2021-10-26 ENCOUNTER — APPOINTMENT (OUTPATIENT)
Dept: ENDOCRINOLOGY | Facility: CLINIC | Age: 71
End: 2021-10-26
Payer: MEDICARE

## 2021-10-26 VITALS
HEART RATE: 80 BPM | SYSTOLIC BLOOD PRESSURE: 144 MMHG | TEMPERATURE: 97.2 F | WEIGHT: 154 LBS | OXYGEN SATURATION: 98 % | HEIGHT: 59 IN | DIASTOLIC BLOOD PRESSURE: 72 MMHG | BODY MASS INDEX: 31.04 KG/M2

## 2021-10-26 LAB
GLUCOSE BLDC GLUCOMTR-MCNC: 198
HBA1C MFR BLD HPLC: 9.1

## 2021-10-26 PROCEDURE — 82962 GLUCOSE BLOOD TEST: CPT

## 2021-10-26 PROCEDURE — 83036 HEMOGLOBIN GLYCOSYLATED A1C: CPT | Mod: QW

## 2021-10-26 PROCEDURE — 99204 OFFICE O/P NEW MOD 45 MIN: CPT | Mod: 25

## 2021-10-26 RX ORDER — GLIPIZIDE AND METFORMIN HYDROCHLORIDE 5; 500 MG/1; MG/1
5-500 TABLET, FILM COATED ORAL
Qty: 60 | Refills: 1 | Status: DISCONTINUED | COMMUNITY
Start: 2021-02-19 | End: 2021-10-26

## 2021-10-28 ENCOUNTER — APPOINTMENT (OUTPATIENT)
Dept: ENDOCRINOLOGY | Facility: CLINIC | Age: 71
End: 2021-10-28
Payer: MEDICARE

## 2021-10-28 ENCOUNTER — APPOINTMENT (OUTPATIENT)
Dept: INTERNAL MEDICINE | Facility: CLINIC | Age: 71
End: 2021-10-28
Payer: MEDICARE

## 2021-10-28 VITALS
HEIGHT: 59 IN | DIASTOLIC BLOOD PRESSURE: 78 MMHG | BODY MASS INDEX: 31.04 KG/M2 | RESPIRATION RATE: 12 BRPM | HEART RATE: 77 BPM | SYSTOLIC BLOOD PRESSURE: 132 MMHG | OXYGEN SATURATION: 93 % | WEIGHT: 154 LBS | TEMPERATURE: 97.3 F

## 2021-10-28 DIAGNOSIS — M54.12 RADICULOPATHY, CERVICAL REGION: ICD-10-CM

## 2021-10-28 LAB
ALBUMIN SERPL ELPH-MCNC: 4.8 G/DL
ALP BLD-CCNC: 65 U/L
ALT SERPL-CCNC: 13 U/L
ANION GAP SERPL CALC-SCNC: 18 MMOL/L
AST SERPL-CCNC: 19 U/L
BASOPHILS # BLD AUTO: 0.04 K/UL
BASOPHILS NFR BLD AUTO: 0.7 %
BILIRUB SERPL-MCNC: 0.4 MG/DL
BUN SERPL-MCNC: 8 MG/DL
CALCIUM SERPL-MCNC: 10.6 MG/DL
CHLORIDE SERPL-SCNC: 98 MMOL/L
CHOLEST SERPL-MCNC: 185 MG/DL
CO2 SERPL-SCNC: 22 MMOL/L
CREAT SERPL-MCNC: 0.75 MG/DL
CREAT SPEC-SCNC: 29 MG/DL
EOSINOPHIL # BLD AUTO: 0.19 K/UL
EOSINOPHIL NFR BLD AUTO: 3.3 %
GLUCOSE SERPL-MCNC: 193 MG/DL
HCT VFR BLD CALC: 39.5 %
HDLC SERPL-MCNC: 86 MG/DL
HGB BLD-MCNC: 12.3 G/DL
IMM GRANULOCYTES NFR BLD AUTO: 0.5 %
LDLC SERPL CALC-MCNC: 72 MG/DL
LYMPHOCYTES # BLD AUTO: 1.59 K/UL
LYMPHOCYTES NFR BLD AUTO: 27.6 %
MAN DIFF?: NORMAL
MCHC RBC-ENTMCNC: 26.3 PG
MCHC RBC-ENTMCNC: 31.1 GM/DL
MCV RBC AUTO: 84.4 FL
MICROALBUMIN 24H UR DL<=1MG/L-MCNC: 2.4 MG/DL
MICROALBUMIN/CREAT 24H UR-RTO: 81 MG/G
MONOCYTES # BLD AUTO: 0.42 K/UL
MONOCYTES NFR BLD AUTO: 7.3 %
NEUTROPHILS # BLD AUTO: 3.5 K/UL
NEUTROPHILS NFR BLD AUTO: 60.6 %
NONHDLC SERPL-MCNC: 100 MG/DL
PLATELET # BLD AUTO: 390 K/UL
POTASSIUM SERPL-SCNC: 4.5 MMOL/L
PROT SERPL-MCNC: 7 G/DL
RBC # BLD: 4.68 M/UL
RBC # FLD: 14.6 %
SODIUM SERPL-SCNC: 138 MMOL/L
TRIGL SERPL-MCNC: 138 MG/DL
TSH SERPL-ACNC: 5.15 UIU/ML
WBC # FLD AUTO: 5.77 K/UL

## 2021-10-28 PROCEDURE — 95251 CONT GLUC MNTR ANALYSIS I&R: CPT

## 2021-10-28 PROCEDURE — ZZZZZ: CPT

## 2021-10-28 PROCEDURE — 99213 OFFICE O/P EST LOW 20 MIN: CPT

## 2021-10-28 PROCEDURE — 95250 CONT GLUC MNTR PHYS/QHP EQP: CPT

## 2021-10-28 NOTE — ASSESSMENT
[FreeTextEntry1] : ACUTE VISIT OF 71 Y OLD FEM WITH LEFT CERV RADICULOPATHY = PT ,ORTHO AND [id number] MG AT HS  ORDERED

## 2021-10-28 NOTE — PHYSICAL EXAM
[Normal] : no acute distress, well nourished, well developed and well-appearing [No JVD] : no jugular venous distention [Supple] : supple [No Respiratory Distress] : no respiratory distress  [Normal Rate] : normal rate  [None Except as Noted] : None except the [Muscle Spasms, Left] : left-sided muscle spasms [Full Except as Noted] : Full except as noted: [Normal Except as Noted] : Normal except as noted: [Negative Except as Noted] : Negative except [No Focal Deficits] : no focal deficits [de-identified] : FROM LISS

## 2021-10-28 NOTE — HISTORY OF PRESENT ILLNESS
[FreeTextEntry8] : CC OF LEFT UPPER EXTR PAIN THAT INITIATES ON BASE OF THE NECK AND LEFT UPPER BACK ;WENT TO CHIROPRACTOR WITH NO BENEFITS

## 2021-11-05 RX ORDER — BLOOD-GLUCOSE METER
W/DEVICE EACH MISCELLANEOUS
Qty: 1 | Refills: 0 | Status: ACTIVE | COMMUNITY
Start: 2021-11-05

## 2021-11-11 ENCOUNTER — NON-APPOINTMENT (OUTPATIENT)
Age: 71
End: 2021-11-11

## 2021-12-05 ENCOUNTER — RX RENEWAL (OUTPATIENT)
Age: 71
End: 2021-12-05

## 2021-12-06 ENCOUNTER — RX RENEWAL (OUTPATIENT)
Age: 71
End: 2021-12-06

## 2021-12-21 ENCOUNTER — APPOINTMENT (OUTPATIENT)
Dept: INTERNAL MEDICINE | Facility: CLINIC | Age: 71
End: 2021-12-21
Payer: MEDICARE

## 2021-12-21 ENCOUNTER — APPOINTMENT (OUTPATIENT)
Dept: ENDOCRINOLOGY | Facility: CLINIC | Age: 71
End: 2021-12-21
Payer: MEDICARE

## 2021-12-21 VITALS
BODY MASS INDEX: 30.64 KG/M2 | TEMPERATURE: 97.1 F | DIASTOLIC BLOOD PRESSURE: 84 MMHG | HEIGHT: 59 IN | HEART RATE: 73 BPM | SYSTOLIC BLOOD PRESSURE: 131 MMHG | OXYGEN SATURATION: 96 % | WEIGHT: 152 LBS | RESPIRATION RATE: 12 BRPM

## 2021-12-21 PROCEDURE — 99214 OFFICE O/P EST MOD 30 MIN: CPT

## 2021-12-21 PROCEDURE — G0108 DIAB MANAGE TRN  PER INDIV: CPT

## 2021-12-21 PROCEDURE — 82962 GLUCOSE BLOOD TEST: CPT

## 2021-12-21 RX ORDER — ONDANSETRON 4 MG/1
4 TABLET, ORALLY DISINTEGRATING ORAL
Qty: 10 | Refills: 0 | Status: DISCONTINUED | COMMUNITY
Start: 2021-02-04 | End: 2021-12-21

## 2021-12-21 NOTE — ASSESSMENT
[FreeTextEntry1] : 71 Y OLD FEM WITH PMX OF DM ,DYSLIPIDEMIA ,OA ,OBESITY  AND CHRONIC GERD= F/U ENDO ;TAPER OMEPRAZOLE AS TOLERATED SINCE SHE IS GERD SX FREE ON DAILY DOSE \par RECOMM MRNA 3ER DOSE 1/22\par RLS= RX SENT \par RTO 3 M \par ALL MEDS RX ASENT

## 2021-12-21 NOTE — HISTORY OF PRESENT ILLNESS
[de-identified] : HAD URI SX 3 WEEKS AGO ,HAD 2ND DOSE COVID-19 MRNA VACCINE 7/21,HAD COVID-19 INF ABOUT 1 Y AGO \par SEEN BY ENDO \par WILL SEE DIETICIAN TODAY \par WALKS DAILY\par CERVICAL RADICULOPATHY GOT BETTER AND DID NOT SEE ORTHO OR WENT TO PT AS RECOMM 10/21

## 2021-12-28 RX ORDER — BLOOD SUGAR DIAGNOSTIC
STRIP MISCELLANEOUS 3 TIMES DAILY
Qty: 3 | Refills: 3 | Status: ACTIVE | COMMUNITY
Start: 2021-12-21 | End: 1900-01-01

## 2021-12-29 RX ORDER — LANCETS 33 GAUGE
EACH MISCELLANEOUS
Qty: 2 | Refills: 1 | Status: ACTIVE | COMMUNITY
Start: 2021-12-28 | End: 1900-01-01

## 2022-01-03 LAB — GLUCOSE BLDC GLUCOMTR-MCNC: 328

## 2022-01-26 ENCOUNTER — APPOINTMENT (OUTPATIENT)
Dept: PULMONOLOGY | Facility: CLINIC | Age: 72
End: 2022-01-26
Payer: MEDICARE

## 2022-01-26 VITALS
TEMPERATURE: 96.3 F | HEIGHT: 59 IN | OXYGEN SATURATION: 95 % | BODY MASS INDEX: 30.64 KG/M2 | WEIGHT: 152 LBS | RESPIRATION RATE: 12 BRPM | HEART RATE: 71 BPM

## 2022-01-26 PROCEDURE — 99214 OFFICE O/P EST MOD 30 MIN: CPT

## 2022-01-26 NOTE — ASSESSMENT
[FreeTextEntry1] : Summary the patient is a 71-year-old female with past medical history significant for intermittent asthma who is status post COVID-19 pneumonia and presents today for follow-up.  Patient's physical exam is significant for improved air entry bilaterally.  The patient is instructed to continue current therapies, prescription renewal performed and to follow-up in 3 months.  A prescription for a nebulizer has been sent

## 2022-01-26 NOTE — HISTORY OF PRESENT ILLNESS
[Former] : former [Never] : never [TextBox_4] : Patient is a 71-year-old female with past medical history significant for diabetes, asthma status post COVID-19 who presents today for follow-up.  Patient states that her cough shortness of breath and dyspnea on exertion have improved with triple therapy.  She does complain of occasional cough.  She denies fevers chills chest pain weight loss or hemoptysis

## 2022-02-23 ENCOUNTER — NON-APPOINTMENT (OUTPATIENT)
Age: 72
End: 2022-02-23

## 2022-03-22 ENCOUNTER — APPOINTMENT (OUTPATIENT)
Dept: INTERNAL MEDICINE | Facility: CLINIC | Age: 72
End: 2022-03-22
Payer: MEDICARE

## 2022-03-22 VITALS
WEIGHT: 155 LBS | RESPIRATION RATE: 12 BRPM | DIASTOLIC BLOOD PRESSURE: 76 MMHG | SYSTOLIC BLOOD PRESSURE: 118 MMHG | HEART RATE: 77 BPM | TEMPERATURE: 96.6 F | BODY MASS INDEX: 31.31 KG/M2 | OXYGEN SATURATION: 95 %

## 2022-03-22 DIAGNOSIS — M15.9 POLYOSTEOARTHRITIS, UNSPECIFIED: ICD-10-CM

## 2022-03-22 PROCEDURE — 99214 OFFICE O/P EST MOD 30 MIN: CPT

## 2022-03-22 NOTE — PHYSICAL EXAM
[Normal Rate] : normal rate  [Regular Rhythm] : with a regular rhythm [No Murmur] : no murmur heard [Normal] : soft, non-tender, non-distended, no masses palpated, no HSM and normal bowel sounds [Coordination Grossly Intact] : coordination grossly intact [Normal Affect] : the affect was normal [de-identified] : LEIDA 2/6

## 2022-03-22 NOTE — REVIEW OF SYSTEMS
[Dyspnea on Exertion] : dyspnea on exertion [Joint Pain] : joint pain [Skin Rash] : skin rash [Negative] : Heme/Lymph

## 2022-03-22 NOTE — ASSESSMENT
[FreeTextEntry1] : 71 Y OLD FEM WITH PMX OF HTN ,DM ,DYSLIPIDEMIA ,OBESITY  AND OA= LABS \par REYES = PULM AND CARDIO F/U \par SCALP RASH = RECOMM DERM EVAL \par DM = ENDO SOON

## 2022-03-22 NOTE — HISTORY OF PRESENT ILLNESS
[de-identified] : COMES WITH DAUGHTER FOR F/U \par CC OF RECURRENT REYES \par CC OF JOINT PAINS\par BSFS  ;WILL SEE ENDO AND PULM 4/22\par DUE FOR CARDIO F/U \par CC OF SCALP RASH ON /OFF

## 2022-03-23 LAB
ALBUMIN SERPL ELPH-MCNC: 4.3 G/DL
ALP BLD-CCNC: 49 U/L
ALT SERPL-CCNC: 10 U/L
ANION GAP SERPL CALC-SCNC: 14 MMOL/L
AST SERPL-CCNC: 21 U/L
BILIRUB SERPL-MCNC: 0.3 MG/DL
BUN SERPL-MCNC: 8 MG/DL
CALCIUM SERPL-MCNC: 10.2 MG/DL
CHLORIDE SERPL-SCNC: 98 MMOL/L
CO2 SERPL-SCNC: 25 MMOL/L
CREAT SERPL-MCNC: 0.74 MG/DL
EGFR: 85 ML/MIN/1.73M2
ESTIMATED AVERAGE GLUCOSE: 151 MG/DL
GLUCOSE SERPL-MCNC: 113 MG/DL
HBA1C MFR BLD HPLC: 6.9 %
POTASSIUM SERPL-SCNC: 3.9 MMOL/L
PROT SERPL-MCNC: 6.9 G/DL
SODIUM SERPL-SCNC: 137 MMOL/L
VIT B12 SERPL-MCNC: 715 PG/ML

## 2022-03-24 LAB
CHOLEST SERPL-MCNC: 153 MG/DL
HDLC SERPL-MCNC: 72 MG/DL
LDLC SERPL CALC-MCNC: 54 MG/DL
NONHDLC SERPL-MCNC: 81 MG/DL
TRIGL SERPL-MCNC: 138 MG/DL

## 2022-04-11 ENCOUNTER — NON-APPOINTMENT (OUTPATIENT)
Age: 72
End: 2022-04-11

## 2022-04-11 ENCOUNTER — APPOINTMENT (OUTPATIENT)
Dept: CARDIOLOGY | Facility: CLINIC | Age: 72
End: 2022-04-11
Payer: MEDICARE

## 2022-04-11 VITALS
WEIGHT: 156 LBS | OXYGEN SATURATION: 97 % | RESPIRATION RATE: 12 BRPM | HEART RATE: 67 BPM | HEIGHT: 59 IN | BODY MASS INDEX: 31.45 KG/M2 | SYSTOLIC BLOOD PRESSURE: 120 MMHG | DIASTOLIC BLOOD PRESSURE: 78 MMHG

## 2022-04-11 PROCEDURE — 99213 OFFICE O/P EST LOW 20 MIN: CPT

## 2022-04-11 PROCEDURE — 93000 ELECTROCARDIOGRAM COMPLETE: CPT

## 2022-04-11 RX ORDER — DULOXETINE HYDROCHLORIDE 30 MG/1
30 CAPSULE, DELAYED RELEASE PELLETS ORAL
Qty: 30 | Refills: 1 | Status: DISCONTINUED | COMMUNITY
Start: 2021-04-23 | End: 2022-04-11

## 2022-04-11 RX ORDER — SITAGLIPTIN 100 MG/1
100 TABLET, FILM COATED ORAL DAILY
Qty: 90 | Refills: 0 | Status: DISCONTINUED | COMMUNITY
Start: 2021-02-19 | End: 2022-04-11

## 2022-04-11 RX ORDER — BUDESONIDE AND FORMOTEROL FUMARATE DIHYDRATE 160; 4.5 UG/1; UG/1
160-4.5 AEROSOL RESPIRATORY (INHALATION)
Qty: 10 | Refills: 0 | Status: DISCONTINUED | COMMUNITY
Start: 2021-04-05 | End: 2022-04-11

## 2022-04-11 NOTE — DISCUSSION/SUMMARY
[FreeTextEntry1] : The patient is a 71-year-old [place]an speaking female DM, hypertension, hyperlipidemia, s/p COVID whose dyspnea has improved and palpitations resolved.\par #1 CV- normal ECG, echo to assess RV\par #2 Htn- continue diltiazem, lisinopril/HCTZ\par #3 Lipids- continue atorvastatin and fenofibrate\par #4 DM- continue januvia, and metformin\par #5 Hypothyroid- continue levothyroxine\par #6 General- encouraged to walk daily and use inspirometer, daughter supportive

## 2022-04-11 NOTE — HISTORY OF PRESENT ILLNESS
[FreeTextEntry1] : Shruthi is breathing better but not really walking for exercise. No CP, palpitations or dizziness. Dtr concerned about RV.

## 2022-04-19 ENCOUNTER — APPOINTMENT (OUTPATIENT)
Dept: CARDIOLOGY | Facility: CLINIC | Age: 72
End: 2022-04-19
Payer: MEDICARE

## 2022-04-19 PROCEDURE — 93306 TTE W/DOPPLER COMPLETE: CPT

## 2022-04-25 ENCOUNTER — APPOINTMENT (OUTPATIENT)
Dept: PULMONOLOGY | Facility: CLINIC | Age: 72
End: 2022-04-25
Payer: MEDICARE

## 2022-04-25 VITALS
SYSTOLIC BLOOD PRESSURE: 136 MMHG | BODY MASS INDEX: 31.1 KG/M2 | TEMPERATURE: 97.8 F | RESPIRATION RATE: 12 BRPM | WEIGHT: 154 LBS | HEART RATE: 94 BPM | OXYGEN SATURATION: 95 % | DIASTOLIC BLOOD PRESSURE: 73 MMHG

## 2022-04-25 PROCEDURE — 99214 OFFICE O/P EST MOD 30 MIN: CPT

## 2022-04-25 NOTE — ASSESSMENT
[FreeTextEntry1] : Summary the patient is a 71-year-old female with past medical history significant for intermittent asthma who is status post COVID-19 pneumonia and presents today for follow-up.  Patient's physical exam is significant for improved air entry bilaterally.  The patient is instructed to continue current therapies, prescription renewal performed and to follow-up in 3 months.

## 2022-04-25 NOTE — HISTORY OF PRESENT ILLNESS
[Never] : never [TextBox_4] : Patient is a 71-year-old female with past medical history significant for diabetes, asthma status post COVID-19 who presents today for follow-up.\par \par \par Patient reports she is still experiencing shortness of breath associated by cough and nasal congestion. She has been taking Allegra which helps a little. She reports using Trelegy daily, but is still using rescue inhaler daily. Denies any fevers, chills, or hemoptysis

## 2022-04-25 NOTE — REVIEW OF SYSTEMS
[Nasal Congestion] : nasal congestion [Cough] : cough [Dyspnea] : dyspnea [SOB on Exertion] : sob on exertion [Negative] : Endocrine

## 2022-04-25 NOTE — REASON FOR VISIT
[Follow-Up] : a follow-up visit [Asthma] : asthma [Cough] : cough [Shortness of Breath] : shortness of breath [Family Member] : family member [TextBox_44] : COVID

## 2022-04-27 ENCOUNTER — APPOINTMENT (OUTPATIENT)
Dept: PULMONOLOGY | Facility: CLINIC | Age: 72
End: 2022-04-27

## 2022-04-27 ENCOUNTER — APPOINTMENT (OUTPATIENT)
Dept: ENDOCRINOLOGY | Facility: CLINIC | Age: 72
End: 2022-04-27
Payer: MEDICARE

## 2022-04-27 VITALS
DIASTOLIC BLOOD PRESSURE: 70 MMHG | BODY MASS INDEX: 31.04 KG/M2 | SYSTOLIC BLOOD PRESSURE: 112 MMHG | HEART RATE: 98 BPM | WEIGHT: 154 LBS | HEIGHT: 59 IN | OXYGEN SATURATION: 95 % | TEMPERATURE: 97.5 F

## 2022-04-27 PROCEDURE — 99214 OFFICE O/P EST MOD 30 MIN: CPT | Mod: 25

## 2022-04-27 PROCEDURE — 95251 CONT GLUC MNTR ANALYSIS I&R: CPT

## 2022-05-20 ENCOUNTER — APPOINTMENT (OUTPATIENT)
Dept: GASTROENTEROLOGY | Facility: CLINIC | Age: 72
End: 2022-05-20

## 2022-06-23 ENCOUNTER — APPOINTMENT (OUTPATIENT)
Dept: INTERNAL MEDICINE | Facility: CLINIC | Age: 72
End: 2022-06-23
Payer: MEDICARE

## 2022-06-23 VITALS
HEART RATE: 92 BPM | RESPIRATION RATE: 12 BRPM | OXYGEN SATURATION: 96 % | TEMPERATURE: 97.2 F | WEIGHT: 149 LBS | SYSTOLIC BLOOD PRESSURE: 112 MMHG | BODY MASS INDEX: 30.09 KG/M2 | DIASTOLIC BLOOD PRESSURE: 75 MMHG

## 2022-06-23 DIAGNOSIS — R06.02 SHORTNESS OF BREATH: ICD-10-CM

## 2022-06-23 DIAGNOSIS — Z91.89 OTHER SPECIFIED PERSONAL RISK FACTORS, NOT ELSEWHERE CLASSIFIED: ICD-10-CM

## 2022-06-23 PROCEDURE — 99397 PER PM REEVAL EST PAT 65+ YR: CPT

## 2022-06-23 RX ORDER — INSULIN ASPART 100 [IU]/ML
100 INJECTION, SOLUTION INTRAVENOUS; SUBCUTANEOUS
Qty: 2 | Refills: 0 | Status: DISCONTINUED | COMMUNITY
Start: 2019-10-04 | End: 2022-06-23

## 2022-06-23 NOTE — HISTORY OF PRESENT ILLNESS
[de-identified] : COMES FOR CPE \par SEEN BY ENDO ,PULM AND CARDIO \par NEEDS ALL MEDS \par DAUGHTER IS CONCERN WITH PT MEMORY DIFFICULTIES \par HAD SINGLE J&J VACCINE OVER 1 Y AGO

## 2022-06-23 NOTE — REVIEW OF SYSTEMS
[Fatigue] : fatigue [Joint Pain] : joint pain [Memory Loss] : memory loss [Unsteady Walking] : ataxia [Negative] : Heme/Lymph

## 2022-06-23 NOTE — ASSESSMENT
[FreeTextEntry1] : CPE OF 72 Y OLD FEM WITH PMX OF HTN ,DYSLIPIDEMIA ,DM AND HYPOTHYROID= LABS ORDERED\par MEMORY LOSS= NEURO EVAL \par RECOMM COVID-19 MODERNA BOOSTER ASAP \par RTO 3 M

## 2022-06-23 NOTE — PHYSICAL EXAM
[Normal] : soft, non-tender, non-distended, no masses palpated, no HSM and normal bowel sounds [No Rash] : no rash [Coordination Grossly Intact] : coordination grossly intact [Normal Affect] : the affect was normal

## 2022-06-24 LAB
ALBUMIN SERPL ELPH-MCNC: 4.5 G/DL
ALP BLD-CCNC: 37 U/L
ALT SERPL-CCNC: 14 U/L
ANION GAP SERPL CALC-SCNC: 18 MMOL/L
AST SERPL-CCNC: 37 U/L
BILIRUB SERPL-MCNC: 0.3 MG/DL
BUN SERPL-MCNC: 7 MG/DL
CALCIUM SERPL-MCNC: 10.2 MG/DL
CHLORIDE SERPL-SCNC: 96 MMOL/L
CHOLEST SERPL-MCNC: 178 MG/DL
CO2 SERPL-SCNC: 20 MMOL/L
CREAT SERPL-MCNC: 0.83 MG/DL
EGFR: 74 ML/MIN/1.73M2
ESTIMATED AVERAGE GLUCOSE: 171 MG/DL
GLUCOSE SERPL-MCNC: 193 MG/DL
HBA1C MFR BLD HPLC: 7.6 %
HDLC SERPL-MCNC: 61 MG/DL
LDLC SERPL CALC-MCNC: 90 MG/DL
NONHDLC SERPL-MCNC: 117 MG/DL
POTASSIUM SERPL-SCNC: 3.9 MMOL/L
PROT SERPL-MCNC: 7.1 G/DL
SODIUM SERPL-SCNC: 134 MMOL/L
TRIGL SERPL-MCNC: 133 MG/DL

## 2022-06-27 LAB — 25(OH)D3 SERPL-MCNC: 22.8 NG/ML

## 2022-07-25 ENCOUNTER — APPOINTMENT (OUTPATIENT)
Dept: ENDOCRINOLOGY | Facility: CLINIC | Age: 72
End: 2022-07-25

## 2022-07-25 ENCOUNTER — APPOINTMENT (OUTPATIENT)
Dept: PULMONOLOGY | Facility: CLINIC | Age: 72
End: 2022-07-25

## 2022-07-25 VITALS
RESPIRATION RATE: 12 BRPM | DIASTOLIC BLOOD PRESSURE: 78 MMHG | HEIGHT: 59 IN | SYSTOLIC BLOOD PRESSURE: 119 MMHG | BODY MASS INDEX: 29.23 KG/M2 | HEART RATE: 106 BPM | TEMPERATURE: 97.5 F | WEIGHT: 145 LBS | OXYGEN SATURATION: 97 %

## 2022-07-25 PROCEDURE — 99214 OFFICE O/P EST MOD 30 MIN: CPT

## 2022-07-25 PROCEDURE — G0108 DIAB MANAGE TRN  PER INDIV: CPT

## 2022-07-25 NOTE — HISTORY OF PRESENT ILLNESS
[Never] : never [TextBox_4] : Patient is a 72-year-old female with past medical history significant for diabetes, asthma status post COVID-19 who presents today for follow-up.\par \par Patient reports that she still has itchy dry intermittent cough. She also endorses dyspnea on exertion. She reports compliance with Trelegy inhaler and reports some improvement of symptoms since starting. Denies any fevers, chills, or hemoptysis. \par

## 2022-07-25 NOTE — ASSESSMENT
[FreeTextEntry1] : Summary the patient is a 72-year-old female with past medical history significant for intermittent asthma who is status post COVID-19 pneumonia and presents today for follow-up.  Patient's physical exam is significant for improved air entry bilaterally.  The patient is instructed to continue current therapies, prescription renewal performed and to follow-up in 3 months.

## 2022-07-25 NOTE — REASON FOR VISIT
[Asthma] : asthma [Cough] : cough [Shortness of Breath] : shortness of breath [Family Member] : family member [TextBox_44] : COVID

## 2022-08-16 ENCOUNTER — APPOINTMENT (OUTPATIENT)
Dept: UROLOGY | Facility: CLINIC | Age: 72
End: 2022-08-16

## 2022-08-16 VITALS
HEIGHT: 59 IN | SYSTOLIC BLOOD PRESSURE: 118 MMHG | HEART RATE: 69 BPM | OXYGEN SATURATION: 96 % | BODY MASS INDEX: 29.23 KG/M2 | WEIGHT: 145 LBS | TEMPERATURE: 98.1 F | DIASTOLIC BLOOD PRESSURE: 76 MMHG | RESPIRATION RATE: 14 BRPM

## 2022-08-16 PROCEDURE — 99214 OFFICE O/P EST MOD 30 MIN: CPT

## 2022-08-16 NOTE — ASSESSMENT
[FreeTextEntry1] : Patient is a 71 yo F h/o poorly controlled DM who presents with dysuria.\par \par Possible UTI - however took recent abx which may affect cx results\par Empiric abx\par Urine for testing\par Also d/w pt that her baseline LUTS/frequency likely related to DM\par F/u prn

## 2022-08-16 NOTE — HISTORY OF PRESENT ILLNESS
[FreeTextEntry1] : Patient is a 71 yo F who presents for dysuria x1 month.  She reports burning sensation while urinating for past 1 month.  Denies gross hematuria, fever/chills or flank pain.\par \par At baseline she has significant urinary frequency q15-30 min normally and stress incontinence.  She usually uses 1 ppd when she goes out as significant activity will leak.\par \par Her friend gave her a pill of abx yesterday, which she states has already made the dysuria better.

## 2022-08-17 ENCOUNTER — APPOINTMENT (OUTPATIENT)
Age: 72
End: 2022-08-17

## 2022-08-17 LAB
APPEARANCE: CLEAR
BACTERIA: NEGATIVE
BILIRUBIN URINE: NEGATIVE
BLOOD URINE: NEGATIVE
COLOR: YELLOW
GLUCOSE QUALITATIVE U: NEGATIVE
HYALINE CASTS: 1 /LPF
KETONES URINE: NEGATIVE
LEUKOCYTE ESTERASE URINE: NEGATIVE
MICROSCOPIC-UA: NORMAL
NITRITE URINE: NEGATIVE
PH URINE: 7
PROTEIN URINE: NORMAL
RED BLOOD CELLS URINE: 3 /HPF
SPECIFIC GRAVITY URINE: 1.02
SQUAMOUS EPITHELIAL CELLS: 3 /HPF
UROBILINOGEN URINE: NORMAL
WHITE BLOOD CELLS URINE: 1 /HPF

## 2022-08-19 ENCOUNTER — APPOINTMENT (OUTPATIENT)
Age: 72
End: 2022-08-19

## 2022-08-19 LAB — BACTERIA UR CULT: NORMAL

## 2022-09-29 ENCOUNTER — APPOINTMENT (OUTPATIENT)
Dept: INTERNAL MEDICINE | Facility: CLINIC | Age: 72
End: 2022-09-29

## 2022-09-29 VITALS
RESPIRATION RATE: 12 BRPM | HEART RATE: 70 BPM | HEIGHT: 59 IN | TEMPERATURE: 97 F | SYSTOLIC BLOOD PRESSURE: 131 MMHG | DIASTOLIC BLOOD PRESSURE: 86 MMHG | BODY MASS INDEX: 29.23 KG/M2 | OXYGEN SATURATION: 97 % | WEIGHT: 145 LBS

## 2022-09-29 PROCEDURE — 99214 OFFICE O/P EST MOD 30 MIN: CPT | Mod: 25

## 2022-09-29 PROCEDURE — 90662 IIV NO PRSV INCREASED AG IM: CPT

## 2022-09-29 PROCEDURE — G0008: CPT

## 2022-09-29 RX ORDER — BLOOD-GLUCOSE METER
EACH MISCELLANEOUS
Qty: 200 | Refills: 0 | Status: DISCONTINUED | COMMUNITY
Start: 2020-11-10 | End: 2022-09-29

## 2022-09-29 RX ORDER — ALBUTEROL SULFATE 90 UG/1
108 (90 BASE) INHALANT RESPIRATORY (INHALATION)
Qty: 36 | Refills: 0 | Status: DISCONTINUED | COMMUNITY
Start: 2021-04-05 | End: 2022-09-29

## 2022-09-29 RX ORDER — TRIAMCINOLONE ACETONIDE 1 MG/G
0.1 CREAM TOPICAL
Qty: 80 | Refills: 0 | Status: DISCONTINUED | COMMUNITY
Start: 2021-01-14 | End: 2022-09-29

## 2022-09-29 RX ORDER — CALCIUM CITRATE/VITAMIN D3 315MG-6.25
315-200 TABLET ORAL
Refills: 0 | Status: DISCONTINUED | COMMUNITY
Start: 2021-04-12 | End: 2022-09-29

## 2022-09-29 RX ORDER — DULAGLUTIDE 0.75 MG/.5ML
0.75 INJECTION, SOLUTION SUBCUTANEOUS
Qty: 6 | Refills: 0 | Status: DISCONTINUED | COMMUNITY
Start: 2022-04-22 | End: 2022-09-29

## 2022-09-29 RX ORDER — ISOPROPYL ALCOHOL 70 ML/100ML
70 SWAB TOPICAL
Qty: 100 | Refills: 0 | Status: DISCONTINUED | COMMUNITY
Start: 2021-01-28 | End: 2022-09-29

## 2022-09-29 RX ORDER — METFORMIN HYDROCHLORIDE 500 MG/1
500 TABLET, COATED ORAL
Qty: 4 | Refills: 3 | Status: DISCONTINUED | COMMUNITY
Start: 2021-06-01 | End: 2022-09-29

## 2022-09-29 RX ORDER — MULTIVIT-MIN/IRON/FOLIC ACID/K 18-600-40
500 CAPSULE ORAL
Refills: 0 | Status: DISCONTINUED | COMMUNITY
Start: 2021-04-12 | End: 2022-09-29

## 2022-09-29 RX ORDER — FLUTICASONE FUROATE, UMECLIDINIUM BROMIDE AND VILANTEROL TRIFENATATE 100; 62.5; 25 UG/1; UG/1; UG/1
100-62.5-25 POWDER RESPIRATORY (INHALATION) DAILY
Qty: 3 | Refills: 3 | Status: DISCONTINUED | COMMUNITY
Start: 2021-11-08 | End: 2022-09-29

## 2022-09-29 RX ORDER — BLOOD-GLUCOSE METER
EACH MISCELLANEOUS
Qty: 4 | Refills: 1 | Status: DISCONTINUED | COMMUNITY
Start: 2021-11-05 | End: 2022-09-29

## 2022-09-29 RX ORDER — BLOOD SUGAR DIAGNOSTIC
STRIP MISCELLANEOUS
Refills: 0 | Status: DISCONTINUED | COMMUNITY
End: 2022-09-29

## 2022-09-29 RX ORDER — B-COMPLEX WITH VITAMIN C
100 TABLET ORAL DAILY
Refills: 0 | Status: DISCONTINUED | COMMUNITY
Start: 2021-04-12 | End: 2022-09-29

## 2022-09-29 NOTE — HISTORY OF PRESENT ILLNESS
[de-identified] : CC OF NILTON LOWER EXTR PAIN AND PARESTHESIAS FOR MONTHS \par CC OF POSITIONAL VERTIGO FOR 3 M \par CC OF THROAT SX AND DRY COUGH DESPITE PULM RX \par DID NOT SEE NEUROLOGY AS RECOMM 3 M AGO \par HAD COVID-19 BOOSTER RECENTLY

## 2022-09-29 NOTE — ASSESSMENT
[FreeTextEntry1] : 72 Y OLD FEM WITH PMX OF ASTHMA = F/U PULM \par DM = LABS ,F/U ENDO 1 M \par THROAT SX= ENT EVAL \par MEMORY LOSS AND PERIPHERAL NEUROPATHY = NEURO AND GABAPENTIN ORDERED\par INFLUENZA VACCINE TODAY

## 2022-09-29 NOTE — PHYSICAL EXAM
[Rounded] : rounded [Normal] : normal [Soft, Nontender] : the abdomen was soft and nontender [No Mass] : no masses were palpated [No HSM] : no hepatosplenomegaly noted [Coordination Grossly Intact] : coordination grossly intact [Speech Grossly Normal] : speech grossly normal [Normal Affect] : the affect was normal

## 2022-09-29 NOTE — REVIEW OF SYSTEMS
[Fatigue] : fatigue [Shortness Of Breath] : shortness of breath [Cough] : cough [Joint Pain] : joint pain [Muscle Weakness] : muscle weakness [Dizziness] : dizziness [Memory Loss] : memory loss [Unsteady Walking] : ataxia [Negative] : Heme/Lymph

## 2022-09-30 LAB
ALBUMIN SERPL ELPH-MCNC: 4.3 G/DL
ALP BLD-CCNC: 47 U/L
ALT SERPL-CCNC: 11 U/L
ANION GAP SERPL CALC-SCNC: 15 MMOL/L
AST SERPL-CCNC: 17 U/L
BILIRUB SERPL-MCNC: 0.3 MG/DL
BUN SERPL-MCNC: 6 MG/DL
CALCIUM SERPL-MCNC: 10.1 MG/DL
CHLORIDE SERPL-SCNC: 100 MMOL/L
CHOLEST SERPL-MCNC: 165 MG/DL
CO2 SERPL-SCNC: 24 MMOL/L
CREAT SERPL-MCNC: 0.73 MG/DL
EGFR: 86 ML/MIN/1.73M2
ESTIMATED AVERAGE GLUCOSE: 160 MG/DL
GLUCOSE SERPL-MCNC: 101 MG/DL
HBA1C MFR BLD HPLC: 7.2 %
HDLC SERPL-MCNC: 63 MG/DL
LDLC SERPL CALC-MCNC: 82 MG/DL
NONHDLC SERPL-MCNC: 101 MG/DL
POTASSIUM SERPL-SCNC: 3.8 MMOL/L
PROT SERPL-MCNC: 6.7 G/DL
SODIUM SERPL-SCNC: 138 MMOL/L
TRIGL SERPL-MCNC: 97 MG/DL

## 2022-10-26 ENCOUNTER — APPOINTMENT (OUTPATIENT)
Dept: PULMONOLOGY | Facility: CLINIC | Age: 72
End: 2022-10-26

## 2022-10-26 ENCOUNTER — APPOINTMENT (OUTPATIENT)
Dept: ENDOCRINOLOGY | Facility: CLINIC | Age: 72
End: 2022-10-26

## 2022-10-26 VITALS
RESPIRATION RATE: 12 BRPM | BODY MASS INDEX: 28.63 KG/M2 | TEMPERATURE: 97.3 F | WEIGHT: 142 LBS | DIASTOLIC BLOOD PRESSURE: 75 MMHG | OXYGEN SATURATION: 96 % | SYSTOLIC BLOOD PRESSURE: 121 MMHG | HEIGHT: 59 IN | HEART RATE: 86 BPM

## 2022-10-26 VITALS
BODY MASS INDEX: 29.89 KG/M2 | TEMPERATURE: 97.2 F | OXYGEN SATURATION: 97 % | DIASTOLIC BLOOD PRESSURE: 62 MMHG | HEART RATE: 85 BPM | SYSTOLIC BLOOD PRESSURE: 118 MMHG | WEIGHT: 148 LBS

## 2022-10-26 DIAGNOSIS — U09.9 POST COVID-19 CONDITION, UNSPECIFIED: ICD-10-CM

## 2022-10-26 PROCEDURE — 99215 OFFICE O/P EST HI 40 MIN: CPT | Mod: 25

## 2022-10-26 PROCEDURE — 95251 CONT GLUC MNTR ANALYSIS I&R: CPT

## 2022-10-26 PROCEDURE — 94726 PLETHYSMOGRAPHY LUNG VOLUMES: CPT

## 2022-10-26 PROCEDURE — 99214 OFFICE O/P EST MOD 30 MIN: CPT | Mod: 25

## 2022-10-26 PROCEDURE — 94060 EVALUATION OF WHEEZING: CPT

## 2022-10-26 NOTE — ASSESSMENT
[FreeTextEntry1] : Summary the patient is a 72-year-old female with past medical history significant for intermittent asthma who is status post COVID-19 pneumonia and presents today for follow-up.  Patient's physical exam is significant for improved air entry bilaterally.  The patient is instructed to continue current therapies and follow-up in 3 months.  Repeat pulmonary function test revealed improved lung volumes although she continues to have moderate restriction secondary to her kyphosis

## 2022-10-26 NOTE — HISTORY OF PRESENT ILLNESS
[Never] : never [TextBox_4] : Patient is a 72-year-old female with past medical history significant for diabetes, asthma status post COVID-19 who presents today for follow-up.\par \par Patient reports she continues to experience dyspnea on exertion and dry cough . She reports compliance with Trelegy inhaler and reports some improvement of symptoms since starting. Denies any fevers, chills, or hemoptysis. \par

## 2022-10-26 NOTE — PHYSICAL EXAM
[Alert] : alert [Well Nourished] : well nourished [Healthy Appearance] : healthy appearance [Obese] : obese [No Acute Distress] : no acute distress [Well Developed] : well developed [Normal Sclera/Conjunctiva] : normal sclera/conjunctiva [EOMI] : extra ocular movement intact [No Proptosis] : no proptosis [Normal Outer Ear/Nose] : the ears and nose were normal in appearance [Normal Hearing] : hearing was normal [Thyroid Not Enlarged] : the thyroid was not enlarged [No Thyroid Nodules] : no palpable thyroid nodules [No Respiratory Distress] : no respiratory distress [No Accessory Muscle Use] : no accessory muscle use [Normal Rate and Effort] : normal respiratory rate and effort [Clear to Auscultation] : lungs were clear to auscultation bilaterally [Normal S1, S2] : normal S1 and S2 [Normal Rate] : heart rate was normal [Regular Rhythm] : with a regular rhythm [No Edema] : no peripheral edema [Normal Bowel Sounds] : normal bowel sounds [Not Tender] : non-tender [Not Distended] : not distended [Soft] : abdomen soft [Normal Anterior Cervical Nodes] : no anterior cervical lymphadenopathy [No Spinal Tenderness] : no spinal tenderness [Spine Straight] : spine straight [No Stigmata of Cushings Syndrome] : no stigmata of Cushings Syndrome [Normal Gait] : normal gait [No Involuntary Movements] : no involuntary movements were seen [Normal Strength/Tone] : muscle strength and tone were normal [No Rash] : no rash [No Motor Deficits] : the motor exam was normal [No Tremors] : no tremors [Oriented x3] : oriented to person, place, and time [Normal Affect] : the affect was normal [Normal Insight/Judgement] : insight and judgment were intact [Normal Mood] : the mood was normal [Acanthosis Nigricans] : no acanthosis nigricans [de-identified] : Vivek on arm

## 2022-10-26 NOTE — HISTORY OF PRESENT ILLNESS
[de-identified] : PT COMES FOR INITIAL VISIT \par SEEN BY PRIOR PMD 2 WEEKS AGO WITH HBA1C OF 9.9 ,NOT USING PREMEAL INSULIN ,ONLY 12 UNITS OF BASAL INSULIN ,JANUVIA AND GLIPIZIDE- METFORMIN;BSFS 200-300\par CC OF PERSISTENT DYSPNEA SINCE SHE DEVELOPED COVID-19 INF 1/31/21 
clear

## 2022-10-26 NOTE — REVIEW OF SYSTEMS
[Polyuria] : polyuria [Nocturia] : nocturia [Joint Pain] : joint pain [Polydipsia] : polydipsia [Negative] : Heme/Lymph [All other systems negative] : All other systems negative [FreeTextEntry9] : Left rotator cuff

## 2022-10-26 NOTE — PHYSICAL EXAM
[No Acute Distress] : no acute distress [Normal Oropharynx] : normal oropharynx [Normal Appearance] : normal appearance [No Neck Mass] : no neck mass [Normal Rate/Rhythm] : normal rate/rhythm [Normal S1, S2] : normal s1, s2 [No Murmurs] : no murmurs [No Resp Distress] : no resp distress [Clear to Auscultation Bilaterally] : clear to auscultation bilaterally [No Abnormalities] : no abnormalities [Benign] : benign [No Clubbing] : no clubbing [No Cyanosis] : no cyanosis [No Edema] : no edema [FROM] : FROM [Normal Color/ Pigmentation] : normal color/ pigmentation [No Focal Deficits] : no focal deficits [Oriented x3] : oriented x3 [Normal Affect] : normal affect [TextBox_99] : ambulates with cane

## 2022-11-08 LAB — TSH SERPL-ACNC: 0.89 UIU/ML

## 2022-12-29 ENCOUNTER — APPOINTMENT (OUTPATIENT)
Dept: INTERNAL MEDICINE | Facility: CLINIC | Age: 72
End: 2022-12-29
Payer: MEDICARE

## 2022-12-29 VITALS
SYSTOLIC BLOOD PRESSURE: 149 MMHG | OXYGEN SATURATION: 95 % | HEIGHT: 59 IN | WEIGHT: 145 LBS | RESPIRATION RATE: 12 BRPM | HEART RATE: 69 BPM | DIASTOLIC BLOOD PRESSURE: 80 MMHG | BODY MASS INDEX: 29.23 KG/M2

## 2022-12-29 DIAGNOSIS — H04.129 DRY EYE SYNDROME OF UNSPECIFIED LACRIMAL GLAND: ICD-10-CM

## 2022-12-29 DIAGNOSIS — G25.81 RESTLESS LEGS SYNDROME: ICD-10-CM

## 2022-12-29 LAB
ALBUMIN SERPL ELPH-MCNC: 4.5 G/DL
ALP BLD-CCNC: 48 U/L
ALT SERPL-CCNC: 12 U/L
ANION GAP SERPL CALC-SCNC: 11 MMOL/L
AST SERPL-CCNC: 17 U/L
BASOPHILS # BLD AUTO: 0.05 K/UL
BASOPHILS NFR BLD AUTO: 0.9 %
BILIRUB SERPL-MCNC: 0.4 MG/DL
BUN SERPL-MCNC: 9 MG/DL
CALCIUM SERPL-MCNC: 10.2 MG/DL
CHLORIDE SERPL-SCNC: 99 MMOL/L
CHOLEST SERPL-MCNC: 159 MG/DL
CO2 SERPL-SCNC: 28 MMOL/L
CREAT SERPL-MCNC: 0.71 MG/DL
EGFR: 89 ML/MIN/1.73M2
EOSINOPHIL # BLD AUTO: 0.18 K/UL
EOSINOPHIL NFR BLD AUTO: 3.2 %
GLUCOSE SERPL-MCNC: 157 MG/DL
HCT VFR BLD CALC: 35.8 %
HDLC SERPL-MCNC: 65 MG/DL
HGB BLD-MCNC: 11.6 G/DL
IMM GRANULOCYTES NFR BLD AUTO: 0.4 %
LDLC SERPL CALC-MCNC: 73 MG/DL
LYMPHOCYTES # BLD AUTO: 2.11 K/UL
LYMPHOCYTES NFR BLD AUTO: 37 %
MAN DIFF?: NORMAL
MCHC RBC-ENTMCNC: 26.8 PG
MCHC RBC-ENTMCNC: 32.4 GM/DL
MCV RBC AUTO: 82.7 FL
MONOCYTES # BLD AUTO: 0.46 K/UL
MONOCYTES NFR BLD AUTO: 8.1 %
NEUTROPHILS # BLD AUTO: 2.88 K/UL
NEUTROPHILS NFR BLD AUTO: 50.4 %
NONHDLC SERPL-MCNC: 95 MG/DL
PLATELET # BLD AUTO: 503 K/UL
POTASSIUM SERPL-SCNC: 4.1 MMOL/L
PROT SERPL-MCNC: 6.7 G/DL
RBC # BLD: 4.33 M/UL
RBC # FLD: 15.5 %
SODIUM SERPL-SCNC: 137 MMOL/L
TRIGL SERPL-MCNC: 108 MG/DL
WBC # FLD AUTO: 5.7 K/UL

## 2022-12-29 PROCEDURE — 99213 OFFICE O/P EST LOW 20 MIN: CPT | Mod: 25

## 2022-12-29 PROCEDURE — 99397 PER PM REEVAL EST PAT 65+ YR: CPT

## 2022-12-29 RX ORDER — CLOBETASOL PROPIONATE 0.5 MG/ML
0.05 SOLUTION TOPICAL
Qty: 50 | Refills: 0 | Status: DISCONTINUED | COMMUNITY
Start: 2022-06-30 | End: 2022-12-29

## 2022-12-29 RX ORDER — FERROUS SULFATE 325(65) MG
325 (65 FE) TABLET ORAL DAILY
Refills: 0 | Status: DISCONTINUED | COMMUNITY
Start: 2021-04-12 | End: 2022-12-29

## 2022-12-29 RX ORDER — GABAPENTIN 100 MG/1
100 CAPSULE ORAL
Qty: 30 | Refills: 1 | Status: DISCONTINUED | COMMUNITY
Start: 2021-10-28 | End: 2022-12-29

## 2022-12-29 RX ORDER — CLOBETASOL PROPIONATE 0.5 MG/G
0.05 OINTMENT TOPICAL
Qty: 30 | Refills: 0 | Status: DISCONTINUED | COMMUNITY
Start: 2022-06-30 | End: 2022-12-29

## 2022-12-29 RX ORDER — CEPHALEXIN 500 MG/1
500 TABLET ORAL
Qty: 6 | Refills: 0 | Status: DISCONTINUED | COMMUNITY
Start: 2022-08-16 | End: 2022-12-29

## 2022-12-29 RX ORDER — HYDROCORTISONE 25 MG/G
2.5 CREAM TOPICAL
Qty: 1 | Refills: 3 | Status: DISCONTINUED | COMMUNITY
Start: 2021-10-06 | End: 2022-12-29

## 2022-12-29 RX ORDER — HYDROCORTISONE 2.5% 25 MG/G
2.5 CREAM TOPICAL
Qty: 30 | Refills: 0 | Status: DISCONTINUED | COMMUNITY
Start: 2020-12-06 | End: 2022-12-29

## 2022-12-29 NOTE — REVIEW OF SYSTEMS
[Joint Pain] : joint pain [Dizziness] : dizziness [Memory Loss] : memory loss [Negative] : Heme/Lymph [FreeTextEntry7] : SEE HPI [de-identified] : SEE HPI

## 2022-12-29 NOTE — HISTORY OF PRESENT ILLNESS
[de-identified] : COMES WITH MULTIPLE COMPLAINS \par STATES THAT FOR 1 M HAS BEEN HAVING A BM AFTER EACH MEAL ,SOLID AND WITH NO SX ;SHE IS TAKING METAMUCIL AND MYRALAX AS RECOMM BY GI \par CC OF LEFT TOES PARESTHESIAS AND LLE PARESTHESIAS AS WELL \par CC OF DRY EYS WAS RX RESTASIS BUT NEEDS RX \par RESTLESS LEG SYNDROME SX ARE WORSENING

## 2022-12-29 NOTE — ASSESSMENT
[FreeTextEntry1] : CPE OF 72 Y OLD FEM = LABS \par HTN = AS PER CARDIO \par DM = LABS \par RESTLESS LEG SYNDROME AND PER NEUROPATHY AS WELL AS MEMORY LOSS= NEURO EVAL \par FREQUENT BM ON MIRALAX AND METAMUCIL = TAPER EITHER ONE AS TOLERATED

## 2023-01-03 LAB
ESTIMATED AVERAGE GLUCOSE: 177 MG/DL
HBA1C MFR BLD HPLC: 7.8 %

## 2023-01-30 ENCOUNTER — APPOINTMENT (OUTPATIENT)
Dept: PULMONOLOGY | Facility: CLINIC | Age: 73
End: 2023-01-30
Payer: MEDICARE

## 2023-01-30 VITALS
OXYGEN SATURATION: 95 % | DIASTOLIC BLOOD PRESSURE: 71 MMHG | SYSTOLIC BLOOD PRESSURE: 122 MMHG | RESPIRATION RATE: 12 BRPM | HEART RATE: 73 BPM | TEMPERATURE: 97.1 F

## 2023-01-30 PROCEDURE — 99214 OFFICE O/P EST MOD 30 MIN: CPT

## 2023-01-30 NOTE — REASON FOR VISIT
[Follow-Up] : a follow-up visit [Asthma] : asthma [Cough] : cough [Shortness of Breath] : shortness of breath [TextBox_44] : COVID

## 2023-01-30 NOTE — ASSESSMENT
[FreeTextEntry1] : Summary the patient is a 72-year-old female with past medical history significant for intermittent asthma who is status post COVID-19 pneumonia and presents today for follow-up.  Patient's physical exam is significant for improved air entry bilaterally.  The patient is instructed to continue current therapies and follow-up in 3 months.  Prescription renewal performed

## 2023-01-31 ENCOUNTER — APPOINTMENT (OUTPATIENT)
Dept: ENDOCRINOLOGY | Facility: CLINIC | Age: 73
End: 2023-01-31
Payer: MEDICARE

## 2023-01-31 VITALS
HEIGHT: 59 IN | HEART RATE: 77 BPM | BODY MASS INDEX: 28.22 KG/M2 | OXYGEN SATURATION: 99 % | WEIGHT: 140 LBS | SYSTOLIC BLOOD PRESSURE: 130 MMHG | DIASTOLIC BLOOD PRESSURE: 74 MMHG

## 2023-01-31 PROCEDURE — 95251 CONT GLUC MNTR ANALYSIS I&R: CPT

## 2023-01-31 PROCEDURE — 99214 OFFICE O/P EST MOD 30 MIN: CPT | Mod: 25

## 2023-02-03 LAB
CREAT SPEC-SCNC: 20 MG/DL
MICROALBUMIN 24H UR DL<=1MG/L-MCNC: <1.2 MG/DL
MICROALBUMIN/CREAT 24H UR-RTO: NORMAL MG/G
T4 FREE SERPL-MCNC: 1.7 NG/DL
TSH SERPL-ACNC: 0.54 UIU/ML

## 2023-02-22 ENCOUNTER — NON-APPOINTMENT (OUTPATIENT)
Age: 73
End: 2023-02-22

## 2023-02-24 ENCOUNTER — APPOINTMENT (OUTPATIENT)
Age: 73
End: 2023-02-24

## 2023-02-24 LAB
APPEARANCE: CLEAR
BACTERIA UR CULT: NORMAL
BACTERIA: NEGATIVE
BILIRUBIN URINE: NEGATIVE
BLOOD URINE: NEGATIVE
COLOR: NORMAL
GLUCOSE QUALITATIVE U: ABNORMAL
HYALINE CASTS: 0 /LPF
KETONES URINE: NEGATIVE
LEUKOCYTE ESTERASE URINE: NEGATIVE
MICROSCOPIC-UA: NORMAL
NITRITE URINE: NEGATIVE
PH URINE: 7
PROTEIN URINE: NEGATIVE
RED BLOOD CELLS URINE: 1 /HPF
SPECIFIC GRAVITY URINE: 1.01
SQUAMOUS EPITHELIAL CELLS: 0 /HPF
UROBILINOGEN URINE: NORMAL
WHITE BLOOD CELLS URINE: 0 /HPF

## 2023-03-01 ENCOUNTER — RX ONLY (RX ONLY)
Age: 73
End: 2023-03-01

## 2023-03-01 ENCOUNTER — OFFICE (OUTPATIENT)
Dept: URBAN - METROPOLITAN AREA CLINIC 90 | Facility: CLINIC | Age: 73
Setting detail: OPHTHALMOLOGY
End: 2023-03-01
Payer: MEDICARE

## 2023-03-01 DIAGNOSIS — H01.002: ICD-10-CM

## 2023-03-01 DIAGNOSIS — H02.825: ICD-10-CM

## 2023-03-01 DIAGNOSIS — H02.834: ICD-10-CM

## 2023-03-01 DIAGNOSIS — H16.223: ICD-10-CM

## 2023-03-01 DIAGNOSIS — H02.831: ICD-10-CM

## 2023-03-01 DIAGNOSIS — H01.004: ICD-10-CM

## 2023-03-01 DIAGNOSIS — H01.005: ICD-10-CM

## 2023-03-01 DIAGNOSIS — H01.001: ICD-10-CM

## 2023-03-01 DIAGNOSIS — E11.3292: ICD-10-CM

## 2023-03-01 PROCEDURE — 92004 COMPRE OPH EXAM NEW PT 1/>: CPT | Performed by: OPHTHALMOLOGY

## 2023-03-01 ASSESSMENT — SPHEQUIV_DERIVED
OS_SPHEQUIV: 0
OD_SPHEQUIV: -0.25

## 2023-03-01 ASSESSMENT — TONOMETRY
OD_IOP_MMHG: 18
OS_IOP_MMHG: 18

## 2023-03-01 ASSESSMENT — AXIALLENGTH_DERIVED
OD_AL: 23.5975
OS_AL: 23.5004

## 2023-03-01 ASSESSMENT — LID EXAM ASSESSMENTS
OD_BLEPHARITIS: RLL RUL 2+
OS_BLEPHARITIS: LLL LUL 2+

## 2023-03-01 ASSESSMENT — CORNEAL SURGICAL SCARRING: OD_SCARRING: STROMAL

## 2023-03-01 ASSESSMENT — CONFRONTATIONAL VISUAL FIELD TEST (CVF)
OS_FINDINGS: FULL
OD_FINDINGS: FULL

## 2023-03-01 ASSESSMENT — REFRACTION_AUTOREFRACTION
OD_AXIS: 172
OD_SPHERE: +0.25
OS_AXIS: 062
OS_SPHERE: +0.25
OD_CYLINDER: -1.00
OS_CYLINDER: -0.50

## 2023-03-01 ASSESSMENT — KERATOMETRY
OD_K1POWER_DIOPTERS: 43.25
OD_K2POWER_DIOPTERS: 44.25
OS_K2POWER_DIOPTERS: 44.00
OS_K1POWER_DIOPTERS: 43.50
OD_AXISANGLE_DEGREES: 062
OS_AXISANGLE_DEGREES: 058

## 2023-03-01 ASSESSMENT — LID POSITION - DERMATOCHALASIS
OS_DERMATOCHALASIS: LUL 3+
OD_DERMATOCHALASIS: RUL 3+

## 2023-03-01 ASSESSMENT — VISUAL ACUITY
OS_BCVA: 20/25
OD_BCVA: 20/25

## 2023-03-01 ASSESSMENT — SUPERFICIAL PUNCTATE KERATITIS (SPK)
OS_SPK: 2+
OD_SPK: 2+

## 2023-03-13 ENCOUNTER — APPOINTMENT (OUTPATIENT)
Age: 73
End: 2023-03-13

## 2023-03-14 RX ORDER — BLOOD SUGAR DIAGNOSTIC
STRIP MISCELLANEOUS
Qty: 4 | Refills: 3 | Status: ACTIVE | COMMUNITY
Start: 2021-11-05 | End: 1900-01-01

## 2023-03-16 ENCOUNTER — APPOINTMENT (OUTPATIENT)
Age: 73
End: 2023-03-16

## 2023-03-16 LAB — FUNGUS SPEC CULT ORG #8: NORMAL

## 2023-03-30 ENCOUNTER — APPOINTMENT (OUTPATIENT)
Dept: INTERNAL MEDICINE | Facility: CLINIC | Age: 73
End: 2023-03-30
Payer: MEDICARE

## 2023-03-30 VITALS
SYSTOLIC BLOOD PRESSURE: 133 MMHG | HEART RATE: 80 BPM | RESPIRATION RATE: 14 BRPM | TEMPERATURE: 97.2 F | BODY MASS INDEX: 28.48 KG/M2 | DIASTOLIC BLOOD PRESSURE: 76 MMHG | OXYGEN SATURATION: 97 % | WEIGHT: 141 LBS

## 2023-03-30 PROCEDURE — 99214 OFFICE O/P EST MOD 30 MIN: CPT

## 2023-03-30 RX ORDER — FENOFIBRATE 134 MG/1
134 CAPSULE ORAL
Qty: 90 | Refills: 1 | Status: DISCONTINUED | COMMUNITY
Start: 2021-01-30 | End: 2023-03-30

## 2023-03-30 RX ORDER — BUDESONIDE, GLYCOPYRROLATE, AND FORMOTEROL FUMARATE 160; 9; 4.8 UG/1; UG/1; UG/1
160-9-4.8 AEROSOL, METERED RESPIRATORY (INHALATION) TWICE DAILY
Qty: 1 | Refills: 3 | Status: DISCONTINUED | COMMUNITY
Start: 2021-05-24 | End: 2023-03-30

## 2023-03-30 RX ORDER — OMEPRAZOLE 20 MG/1
20 CAPSULE, DELAYED RELEASE ORAL
Qty: 90 | Refills: 1 | Status: DISCONTINUED | COMMUNITY
Start: 2021-08-24 | End: 2023-03-30

## 2023-03-30 RX ORDER — LISINOPRIL AND HYDROCHLOROTHIAZIDE TABLETS 10; 12.5 MG/1; MG/1
10-12.5 TABLET ORAL DAILY
Qty: 90 | Refills: 1 | Status: DISCONTINUED | COMMUNITY
Start: 1900-01-01 | End: 2023-03-30

## 2023-03-30 NOTE — PHYSICAL EXAM
[No Acute Distress] : no acute distress [Normal] : soft, non-tender, non-distended, no masses palpated, no HSM and normal bowel sounds [No Joint Swelling] : no joint swelling [No Rash] : no rash [Coordination Grossly Intact] : coordination grossly intact [No Focal Deficits] : no focal deficits [Normal Affect] : the affect was normal [de-identified] : WALKS WITH CANE

## 2023-03-30 NOTE — ASSESSMENT
[FreeTextEntry1] : 72 Y OLD WITH PERSISTENT DRY COUGH NOT HELPED BY OMEPRAZOLE OR SINGULAIR = STOP [id number] ;START LOSARTAN 50-12.5\par DM = LABS \par DYSLIPIDEMIA = OFF FENOFIBRATE = LABS \par RLS AS PER NEURO \par RTO 3 M

## 2023-03-31 LAB
ALBUMIN SERPL ELPH-MCNC: 4.5 G/DL
ALP BLD-CCNC: 72 U/L
ALT SERPL-CCNC: 14 U/L
ANION GAP SERPL CALC-SCNC: 14 MMOL/L
AST SERPL-CCNC: 16 U/L
BILIRUB SERPL-MCNC: 0.4 MG/DL
BUN SERPL-MCNC: 6 MG/DL
CALCIUM SERPL-MCNC: 9.9 MG/DL
CHLORIDE SERPL-SCNC: 96 MMOL/L
CO2 SERPL-SCNC: 25 MMOL/L
CREAT SERPL-MCNC: 0.56 MG/DL
EGFR: 96 ML/MIN/1.73M2
ESTIMATED AVERAGE GLUCOSE: 183 MG/DL
GLUCOSE SERPL-MCNC: 227 MG/DL
HBA1C MFR BLD HPLC: 8 %
POTASSIUM SERPL-SCNC: 3.8 MMOL/L
PROT SERPL-MCNC: 6.7 G/DL
SODIUM SERPL-SCNC: 135 MMOL/L

## 2023-04-01 LAB
CHOLEST SERPL-MCNC: 199 MG/DL
HDLC SERPL-MCNC: 76 MG/DL
LDLC SERPL CALC-MCNC: 90 MG/DL
NONHDLC SERPL-MCNC: 123 MG/DL
TRIGL SERPL-MCNC: 163 MG/DL

## 2023-04-06 ENCOUNTER — TRANSCRIPTION ENCOUNTER (OUTPATIENT)
Age: 73
End: 2023-04-06

## 2023-04-26 ENCOUNTER — APPOINTMENT (OUTPATIENT)
Dept: PULMONOLOGY | Facility: CLINIC | Age: 73
End: 2023-04-26
Payer: MEDICARE

## 2023-04-26 VITALS
HEIGHT: 59 IN | WEIGHT: 140 LBS | DIASTOLIC BLOOD PRESSURE: 82 MMHG | SYSTOLIC BLOOD PRESSURE: 136 MMHG | HEART RATE: 79 BPM | BODY MASS INDEX: 28.22 KG/M2 | OXYGEN SATURATION: 96 % | RESPIRATION RATE: 14 BRPM

## 2023-04-26 PROCEDURE — 99214 OFFICE O/P EST MOD 30 MIN: CPT

## 2023-04-26 NOTE — HISTORY OF PRESENT ILLNESS
[Never] : never [TextBox_4] : Patient is a 72-year-old female with past medical history significant for diabetes, asthma status post COVID-19 who presents today for follow-up.\par \par \par Patient reports she continues to have some dyspnea on exertion and occasional cough which seems to be worse at night.  Denies any fevers, chills, or hemoptysis. She reports continued use of Trelegy inhaler \par  \par

## 2023-05-01 ENCOUNTER — APPOINTMENT (OUTPATIENT)
Dept: NEUROLOGY | Facility: CLINIC | Age: 73
End: 2023-05-01

## 2023-06-27 ENCOUNTER — APPOINTMENT (OUTPATIENT)
Dept: INTERNAL MEDICINE | Facility: CLINIC | Age: 73
End: 2023-06-27
Payer: MEDICARE

## 2023-06-27 ENCOUNTER — APPOINTMENT (OUTPATIENT)
Dept: ENDOCRINOLOGY | Facility: CLINIC | Age: 73
End: 2023-06-27
Payer: MEDICARE

## 2023-06-27 VITALS
HEIGHT: 59 IN | OXYGEN SATURATION: 95 % | RESPIRATION RATE: 14 BRPM | BODY MASS INDEX: 27.82 KG/M2 | SYSTOLIC BLOOD PRESSURE: 124 MMHG | TEMPERATURE: 97.8 F | DIASTOLIC BLOOD PRESSURE: 84 MMHG | WEIGHT: 138 LBS | HEART RATE: 89 BPM

## 2023-06-27 VITALS
SYSTOLIC BLOOD PRESSURE: 130 MMHG | OXYGEN SATURATION: 98 % | HEART RATE: 81 BPM | BODY MASS INDEX: 27.82 KG/M2 | HEIGHT: 59 IN | DIASTOLIC BLOOD PRESSURE: 82 MMHG | WEIGHT: 138 LBS

## 2023-06-27 VITALS
HEIGHT: 59 IN | SYSTOLIC BLOOD PRESSURE: 120 MMHG | HEART RATE: 88 BPM | OXYGEN SATURATION: 98 % | DIASTOLIC BLOOD PRESSURE: 74 MMHG | WEIGHT: 138 LBS | BODY MASS INDEX: 27.82 KG/M2

## 2023-06-27 DIAGNOSIS — M17.0 BILATERAL PRIMARY OSTEOARTHRITIS OF KNEE: ICD-10-CM

## 2023-06-27 DIAGNOSIS — E11.65 TYPE 2 DIABETES MELLITUS WITH HYPERGLYCEMIA: ICD-10-CM

## 2023-06-27 LAB — HBA1C MFR BLD HPLC: 9.3

## 2023-06-27 PROCEDURE — 99214 OFFICE O/P EST MOD 30 MIN: CPT

## 2023-06-27 PROCEDURE — 99214 OFFICE O/P EST MOD 30 MIN: CPT | Mod: 25

## 2023-06-27 PROCEDURE — 83036 HEMOGLOBIN GLYCOSYLATED A1C: CPT | Mod: QW

## 2023-06-27 PROCEDURE — 95251 CONT GLUC MNTR ANALYSIS I&R: CPT

## 2023-06-27 RX ORDER — ALBUTEROL SULFATE 0.63 MG/3ML
0.63 SOLUTION RESPIRATORY (INHALATION)
Qty: 3 | Refills: 3 | Status: DISCONTINUED | COMMUNITY
Start: 2022-01-26 | End: 2023-06-27

## 2023-06-27 NOTE — PHYSICAL EXAM
[No Acute Distress] : no acute distress [Normal Outer Ear/Nose] : the outer ears and nose were normal in appearance [Normal] : no respiratory distress, lungs were clear to auscultation bilaterally and no accessory muscle use [Normal Rate] : normal rate  [Regular Rhythm] : with a regular rhythm [No Edema] : there was no peripheral edema [Soft] : abdomen soft [Non Tender] : non-tender [Normal Bowel Sounds] : normal bowel sounds [No CVA Tenderness] : no CVA  tenderness [Coordination Grossly Intact] : coordination grossly intact [Normal Affect] : the affect was normal [de-identified] : FACAIL RASH LEFT SIDE

## 2023-06-27 NOTE — ASSESSMENT
[FreeTextEntry1] : 73 Y OLD FEM WITH PMX OF HTN ,HYPOTHYROID ,DM ,DYSLIPIDEMIA ,OA AND GERD= CONTINUE CURRENT MEDS ;LABS LATER TODAY WITH ENDO \par LEFT FOOT SX= TO SEE POD \par FACIAL DERMATITIS = AS PER DERMA

## 2023-06-27 NOTE — REVIEW OF SYSTEMS
[Dyspnea on Exertion] : dyspnea on exertion [Joint Pain] : joint pain [Joint Stiffness] : joint stiffness [Muscle Pain] : muscle pain [Itching] : Itching [Skin Rash] : skin rash [Negative] : Heme/Lymph

## 2023-06-28 ENCOUNTER — NON-APPOINTMENT (OUTPATIENT)
Age: 73
End: 2023-06-28

## 2023-06-28 DIAGNOSIS — E55.9 VITAMIN D DEFICIENCY, UNSPECIFIED: ICD-10-CM

## 2023-06-28 DIAGNOSIS — Z13.820 ENCOUNTER FOR SCREENING FOR OSTEOPOROSIS: ICD-10-CM

## 2023-06-28 LAB
25(OH)D3 SERPL-MCNC: 20.5 NG/ML
T4 FREE SERPL-MCNC: 1.5 NG/DL
TSH SERPL-ACNC: 0.48 UIU/ML

## 2023-06-28 RX ORDER — ERGOCALCIFEROL 1.25 MG/1
1.25 MG CAPSULE, LIQUID FILLED ORAL
Qty: 6 | Refills: 0 | Status: ACTIVE | COMMUNITY
Start: 2023-06-28 | End: 1900-01-01

## 2023-07-25 ENCOUNTER — APPOINTMENT (OUTPATIENT)
Dept: PODIATRY | Facility: CLINIC | Age: 73
End: 2023-07-25
Payer: MEDICARE

## 2023-07-25 DIAGNOSIS — M20.42 OTHER HAMMER TOE(S) (ACQUIRED), RIGHT FOOT: ICD-10-CM

## 2023-07-25 DIAGNOSIS — M20.41 OTHER HAMMER TOE(S) (ACQUIRED), RIGHT FOOT: ICD-10-CM

## 2023-07-25 DIAGNOSIS — R20.0 ANESTHESIA OF SKIN: ICD-10-CM

## 2023-07-25 DIAGNOSIS — M21.612 BUNION OF LEFT FOOT: ICD-10-CM

## 2023-07-25 DIAGNOSIS — M21.611 BUNION OF RIGHT FOOT: ICD-10-CM

## 2023-07-25 DIAGNOSIS — E11.42 TYPE 2 DIABETES MELLITUS WITH DIABETIC POLYNEUROPATHY: ICD-10-CM

## 2023-07-25 PROCEDURE — 99203 OFFICE O/P NEW LOW 30 MIN: CPT

## 2023-07-26 PROBLEM — E11.42 TYPE 2 DIABETES MELLITUS WITH DIABETIC POLYNEUROPATHY: Status: ACTIVE | Noted: 2023-07-26

## 2023-07-26 NOTE — PHYSICAL EXAM
[1+] : left foot posterior tibialis 1+ [2+] : left foot dorsalis pedis 2+ [Skin Color & Pigmentation] : normal skin color and pigmentation [Skin Turgor] : normal skin turgor [Skin Lesions] : no skin lesions [No Focal Deficits] : no focal deficits [Deep Tendon Reflexes (DTR)] : deep tendon reflexes were 2+ and symmetric [Motor Exam] : the motor exam was normal [Vibration Dec.] : diminished vibratory sensation at the level of the toes [Diminished Throughout Right Foot] : diminished sensation with monofilament testing throughout right foot [Diminished Throughout Left Foot] : diminished sensation with monofilament testing throughout left foot [Oriented To Time, Place, And Person] : oriented to person, place, and time [Affect] : the affect was normal [Impaired Insight] : insight and judgment were intact [General Appearance - Alert] : alert [Ankle Swelling (On Exam)] : not present [Varicose Veins Of Lower Extremities] : not present [] : not present [Delayed in the Right Toes] : capillary refills normal in right toes [Delayed in the Left Toes] : capillary refills normal in the left toes [de-identified] : Forefoot varus, fully compensated.  HAV with bunion.  Contracted digits 2 through 5.  These are relatively asymptomatic at this time.   [Foot Ulcer] : no foot ulcer [Skin Induration] : no skin induration [FreeTextEntry1] : No calluses present.  No evidence of any preulcerative lesions.   [Position Sense Dec.] : normal position sense at the level of the toes

## 2023-07-26 NOTE — HISTORY OF PRESENT ILLNESS
[FreeTextEntry1] : Patient presents today with her daughter with a burning sensation, bilateral feet.  She has an A1c of 9%.  Her fasting blood sugar today was 164.  She is 20-years diabetic.  She has attempted Gabapentin, which has not really helped.  She tried to double up on it and had a problem, falling.  She was advised against that.   V-Y Plasty Text: The defect edges were debeveled with a #15 scalpel blade.  Given the location of the defect, shape of the defect and the proximity to free margins an V-Y advancement flap was deemed most appropriate.  Using a sterile surgical marker, an appropriate advancement flap was drawn incorporating the defect and placing the expected incisions within the relaxed skin tension lines where possible.    The area thus outlined was incised deep to adipose tissue with a #15 scalpel blade.  The skin margins were undermined to an appropriate distance in all directions utilizing iris scissors.

## 2023-07-26 NOTE — ASSESSMENT
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  \par The Gabapentin is not working.  She was advised, since the numbness is worse at night, to use light socks, which may help as well as CBD cream.  She had double up on the Gabapentin and was prone to falls...Patient advised to consult with you primary doctor when adjusting any meds \par

## 2023-07-31 ENCOUNTER — APPOINTMENT (OUTPATIENT)
Dept: PULMONOLOGY | Facility: CLINIC | Age: 73
End: 2023-07-31
Payer: MEDICARE

## 2023-07-31 VITALS
SYSTOLIC BLOOD PRESSURE: 112 MMHG | OXYGEN SATURATION: 97 % | HEART RATE: 89 BPM | DIASTOLIC BLOOD PRESSURE: 82 MMHG | RESPIRATION RATE: 14 BRPM

## 2023-07-31 PROCEDURE — 99214 OFFICE O/P EST MOD 30 MIN: CPT

## 2023-07-31 NOTE — HISTORY OF PRESENT ILLNESS
[Former] : former [Never] : never [TextBox_4] : patient is a 73-year-old female with past medical history significant for asthma.  patient reports she feels well and offers no complaints and denies shortness of breath.  Patient is here for a follow up visit.  [TextBox_13] : 40

## 2023-09-06 ENCOUNTER — APPOINTMENT (OUTPATIENT)
Dept: COLORECTAL SURGERY | Facility: CLINIC | Age: 73
End: 2023-09-06
Payer: MEDICARE

## 2023-09-06 DIAGNOSIS — L03.317 CELLULITIS OF BUTTOCK: ICD-10-CM

## 2023-09-06 PROCEDURE — 99214 OFFICE O/P EST MOD 30 MIN: CPT

## 2023-09-06 NOTE — PHYSICAL EXAM
[de-identified] : Left sore (2x2mm) with gluteal cellulitis does not appear to communicate with the anal canal

## 2023-09-06 NOTE — HISTORY OF PRESENT ILLNESS
[FreeTextEntry1] : 73-year-old female seen in the past for pruritus presents as referred by dermatology to rule out anal fistula. The patient has had a lesion on her left followup for several months. She denies fever locker 4/security/safe

## 2023-09-06 NOTE — ASSESSMENT
[FreeTextEntry1] : 73-year-old female with MRSA cellulitis of the gluteus was no undrained collection.  Plan: 10 day course of antibiotics, followup in one month

## 2023-09-28 ENCOUNTER — APPOINTMENT (OUTPATIENT)
Dept: OBGYN | Facility: CLINIC | Age: 73
End: 2023-09-28
Payer: MEDICARE

## 2023-09-28 VITALS
HEIGHT: 58 IN | BODY MASS INDEX: 27.71 KG/M2 | WEIGHT: 132 LBS | SYSTOLIC BLOOD PRESSURE: 160 MMHG | DIASTOLIC BLOOD PRESSURE: 88 MMHG

## 2023-09-28 DIAGNOSIS — Z01.419 ENCOUNTER FOR GYNECOLOGICAL EXAMINATION (GENERAL) (ROUTINE) W/OUT ABNORMAL FINDINGS: ICD-10-CM

## 2023-09-28 PROCEDURE — 99387 INIT PM E/M NEW PAT 65+ YRS: CPT

## 2023-09-28 RX ORDER — TERCONAZOLE 8 MG/G
0.8 CREAM VAGINAL
Qty: 1 | Refills: 0 | Status: ACTIVE | COMMUNITY
Start: 2023-09-28 | End: 1900-01-01

## 2023-09-28 RX ORDER — CLOTRIMAZOLE AND BETAMETHASONE DIPROPIONATE 10; .5 MG/G; MG/G
1-0.05 CREAM TOPICAL TWICE DAILY
Qty: 1 | Refills: 2 | Status: ACTIVE | COMMUNITY
Start: 2023-09-28 | End: 1900-01-01

## 2023-10-02 LAB
BACTERIA UR CULT: NORMAL
CANDIDA VAG CYTO: NOT DETECTED
G VAGINALIS+PREV SP MTYP VAG QL MICRO: NOT DETECTED
HPV HIGH+LOW RISK DNA PNL CVX: NOT DETECTED
T VAGINALIS VAG QL WET PREP: NOT DETECTED

## 2023-10-03 ENCOUNTER — APPOINTMENT (OUTPATIENT)
Dept: INTERNAL MEDICINE | Facility: CLINIC | Age: 73
End: 2023-10-03
Payer: MEDICARE

## 2023-10-03 VITALS
BODY MASS INDEX: 28.55 KG/M2 | RESPIRATION RATE: 14 BRPM | WEIGHT: 136 LBS | OXYGEN SATURATION: 96 % | SYSTOLIC BLOOD PRESSURE: 126 MMHG | HEIGHT: 58 IN | DIASTOLIC BLOOD PRESSURE: 80 MMHG | HEART RATE: 81 BPM

## 2023-10-03 DIAGNOSIS — G62.9 POLYNEUROPATHY, UNSPECIFIED: ICD-10-CM

## 2023-10-03 PROCEDURE — 90471 IMMUNIZATION ADMIN: CPT

## 2023-10-03 PROCEDURE — 90662 IIV NO PRSV INCREASED AG IM: CPT

## 2023-10-03 PROCEDURE — 99214 OFFICE O/P EST MOD 30 MIN: CPT | Mod: 25

## 2023-10-04 LAB
ALBUMIN SERPL ELPH-MCNC: 4.5 G/DL
ALP BLD-CCNC: 67 U/L
ALT SERPL-CCNC: 8 U/L
ANION GAP SERPL CALC-SCNC: 13 MMOL/L
AST SERPL-CCNC: 21 U/L
BILIRUB SERPL-MCNC: 0.3 MG/DL
BUN SERPL-MCNC: 9 MG/DL
CALCIUM SERPL-MCNC: 9.9 MG/DL
CHLORIDE SERPL-SCNC: 99 MMOL/L
CHOLEST SERPL-MCNC: 181 MG/DL
CO2 SERPL-SCNC: 26 MMOL/L
CREAT SERPL-MCNC: 0.59 MG/DL
EGFR: 94 ML/MIN/1.73M2
ESTIMATED AVERAGE GLUCOSE: 180 MG/DL
GLUCOSE SERPL-MCNC: 109 MG/DL
HBA1C MFR BLD HPLC: 7.9 %
HDLC SERPL-MCNC: 72 MG/DL
LDLC SERPL CALC-MCNC: 84 MG/DL
NONHDLC SERPL-MCNC: 110 MG/DL
POTASSIUM SERPL-SCNC: 4.4 MMOL/L
PROT SERPL-MCNC: 7.2 G/DL
SODIUM SERPL-SCNC: 137 MMOL/L
TRIGL SERPL-MCNC: 153 MG/DL

## 2023-10-05 LAB — CYTOLOGY CVX/VAG DOC THIN PREP: ABNORMAL

## 2023-10-12 RX ORDER — OMEPRAZOLE 20 MG/1
20 CAPSULE, DELAYED RELEASE ORAL DAILY
Qty: 90 | Refills: 0 | Status: ACTIVE | COMMUNITY
Start: 1900-01-01 | End: 1900-01-01

## 2023-10-20 ENCOUNTER — APPOINTMENT (OUTPATIENT)
Dept: ENDOCRINOLOGY | Facility: CLINIC | Age: 73
End: 2023-10-20
Payer: MEDICARE

## 2023-10-20 VITALS
HEIGHT: 58 IN | DIASTOLIC BLOOD PRESSURE: 70 MMHG | WEIGHT: 134 LBS | SYSTOLIC BLOOD PRESSURE: 120 MMHG | BODY MASS INDEX: 28.13 KG/M2 | OXYGEN SATURATION: 95 % | HEART RATE: 77 BPM

## 2023-10-20 PROCEDURE — 95251 CONT GLUC MNTR ANALYSIS I&R: CPT

## 2023-10-20 PROCEDURE — 99214 OFFICE O/P EST MOD 30 MIN: CPT | Mod: 25

## 2023-10-20 RX ORDER — PEN NEEDLE, DIABETIC 29 G X1/2"
32G X 4 MM NEEDLE, DISPOSABLE MISCELLANEOUS
Qty: 3 | Refills: 3 | Status: ACTIVE | COMMUNITY
Start: 2019-10-04 | End: 1900-01-01

## 2023-10-20 RX ORDER — INSULIN GLARGINE 100 [IU]/ML
100 INJECTION, SOLUTION SUBCUTANEOUS DAILY
Qty: 1 | Refills: 2 | Status: ACTIVE | COMMUNITY
Start: 2019-10-04 | End: 1900-01-01

## 2023-11-13 ENCOUNTER — APPOINTMENT (OUTPATIENT)
Dept: PULMONOLOGY | Facility: CLINIC | Age: 73
End: 2023-11-13
Payer: MEDICARE

## 2023-11-13 VITALS
BODY MASS INDEX: 28.34 KG/M2 | HEIGHT: 58 IN | HEART RATE: 80 BPM | WEIGHT: 135 LBS | DIASTOLIC BLOOD PRESSURE: 87 MMHG | RESPIRATION RATE: 14 BRPM | OXYGEN SATURATION: 97 % | SYSTOLIC BLOOD PRESSURE: 124 MMHG

## 2023-11-13 DIAGNOSIS — U07.1 COVID-19: ICD-10-CM

## 2023-11-13 DIAGNOSIS — N76.2 ACUTE VULVITIS: ICD-10-CM

## 2023-11-13 DIAGNOSIS — Z87.09 PERSONAL HISTORY OF OTHER DISEASES OF THE RESPIRATORY SYSTEM: ICD-10-CM

## 2023-11-13 PROCEDURE — 99214 OFFICE O/P EST MOD 30 MIN: CPT

## 2023-12-28 ENCOUNTER — APPOINTMENT (OUTPATIENT)
Dept: UROGYNECOLOGY | Facility: CLINIC | Age: 73
End: 2023-12-28
Payer: MEDICARE

## 2023-12-28 VITALS
HEIGHT: 58 IN | DIASTOLIC BLOOD PRESSURE: 81 MMHG | WEIGHT: 132 LBS | HEART RATE: 81 BPM | SYSTOLIC BLOOD PRESSURE: 133 MMHG | BODY MASS INDEX: 27.71 KG/M2

## 2023-12-28 DIAGNOSIS — N95.2 POSTMENOPAUSAL ATROPHIC VAGINITIS: ICD-10-CM

## 2023-12-28 DIAGNOSIS — R35.0 FREQUENCY OF MICTURITION: ICD-10-CM

## 2023-12-28 DIAGNOSIS — R35.1 NOCTURIA: ICD-10-CM

## 2023-12-28 LAB
BILIRUB UR QL STRIP: NORMAL
CLARITY UR: CLEAR
COLLECTION METHOD: NORMAL
GLUCOSE UR-MCNC: NORMAL
HCG UR QL: 0.2 EU/DL
HGB UR QL STRIP.AUTO: ABNORMAL
KETONES UR-MCNC: NORMAL
LEUKOCYTE ESTERASE UR QL STRIP: NORMAL
NITRITE UR QL STRIP: NORMAL
PH UR STRIP: 8.5
PROT UR STRIP-MCNC: NORMAL
SP GR UR STRIP: 1.01

## 2023-12-28 PROCEDURE — 99214 OFFICE O/P EST MOD 30 MIN: CPT | Mod: 25

## 2023-12-28 PROCEDURE — 51701 INSERT BLADDER CATHETER: CPT

## 2023-12-28 RX ORDER — ESTRADIOL 0.1 MG/G
0.1 CREAM VAGINAL
Qty: 1 | Refills: 3 | Status: ACTIVE | COMMUNITY
Start: 2023-12-28 | End: 1900-01-01

## 2023-12-28 NOTE — HISTORY OF PRESENT ILLNESS
[FreeTextEntry1] : LUDY HERNANDEZ is a 73-year-old P6 presenting for evaluation of urinary complaints. She reports urinary frequency, urgency, and occasional urgency and stress incontinence. However, she is most bothered by burning and discomfort with urination. Reports that she is not as concerned with the frequency as she is aware that it's due to her fluid intake.   Daytime voids: every hour  Nighttime voids: every 1.5-2 hours  Her fluid intake includes: 96 oz of water, 16 oz of coffee. Stops drinking fluids at 7pm.   PMH: DM, HTN, asthma, HLD PSH:  section, knee surgery, cataract surgery Social Hx: non-smoker

## 2023-12-28 NOTE — DISCUSSION/SUMMARY
[FreeTextEntry1] : Pt complains of dysuria and discomfort with urination. Exam was notable for significant vaginal and vulvar atrophy. She has a normal PVR. No evidence of prolapse. Urine dipstick notable for trace blood, therefore, will send UA/UCx, however low suspicion for UTI. Also sent AFFIRM to rule out vaginitis. Suspect that symptoms are most likely attributed to atrophic changes from hypoestrogenic state. I reviewed benefits of vaginal estrogen therapy, which patient is amenable to. Rx sent to pharmacy and instructions were provided. RTO in 6-8 weeks.

## 2023-12-28 NOTE — PHYSICAL EXAM
[Chaperone Present] : A chaperone was present in the examining room during all aspects of the physical examination [Vulvar Atrophy] : vulvar atrophy [Atrophy] : atrophy [Dry Mucosa] : dry mucosa [Normal] : normal [FreeTextEntry1] : General: Well, appearing, no acute distress HEENT: Normocephalic, atraumatic Respiratory: Speaking in full sentences comfortably, normal work of breathing and no cough during visit Extremities: No upper extremity edema noted Skin: No obvious rash or skin lesions Neuro: Alert and oriented x 3, speech is fluent, normal rate Psych: Normal mood and affect

## 2023-12-28 NOTE — PROCEDURE
[FreeTextEntry1] : A sterile straight catheterization was performed to rule out a urinary tract infection and measure postvoid residual volume, which was 35 ml.

## 2024-01-02 DIAGNOSIS — Z63.5 DISRUPTION OF FAMILY BY SEPARATION AND DIVORCE: ICD-10-CM

## 2024-01-02 DIAGNOSIS — Z86.39 PERSONAL HISTORY OF OTHER ENDOCRINE, NUTRITIONAL AND METABOLIC DISEASE: ICD-10-CM

## 2024-01-02 LAB
APPEARANCE: CLEAR
BACTERIA UR CULT: NORMAL
BACTERIA: NEGATIVE /HPF
BILIRUBIN URINE: NEGATIVE
BLOOD URINE: NEGATIVE
CANDIDA VAG CYTO: DETECTED
CAST: 0 /LPF
COLOR: YELLOW
EPITHELIAL CELLS: 0 /HPF
G VAGINALIS+PREV SP MTYP VAG QL MICRO: NOT DETECTED
GLUCOSE QUALITATIVE U: NEGATIVE MG/DL
KETONES URINE: NEGATIVE MG/DL
LEUKOCYTE ESTERASE URINE: NEGATIVE
MICROSCOPIC-UA: NORMAL
NITRITE URINE: NEGATIVE
PH URINE: 8
PROTEIN URINE: NEGATIVE MG/DL
RED BLOOD CELLS URINE: 3 /HPF
SPECIFIC GRAVITY URINE: 1.01
T VAGINALIS VAG QL WET PREP: NOT DETECTED
UROBILINOGEN URINE: 0.2 MG/DL
WHITE BLOOD CELLS URINE: 0 /HPF

## 2024-01-02 RX ORDER — FLUTICASONE FUROATE, UMECLIDINIUM BROMIDE AND VILANTEROL TRIFENATATE 200; 62.5; 25 UG/1; UG/1; UG/1
POWDER RESPIRATORY (INHALATION)
Refills: 0 | Status: ACTIVE | COMMUNITY

## 2024-01-02 SDOH — SOCIAL STABILITY - SOCIAL INSECURITY: DISRUPTION OF FAMILY BY SEPARATION AND DIVORCE: Z63.5

## 2024-01-11 ENCOUNTER — APPOINTMENT (OUTPATIENT)
Dept: INTERNAL MEDICINE | Facility: CLINIC | Age: 74
End: 2024-01-11
Payer: MEDICARE

## 2024-01-11 VITALS
RESPIRATION RATE: 14 BRPM | TEMPERATURE: 97.3 F | HEART RATE: 78 BPM | OXYGEN SATURATION: 97 % | SYSTOLIC BLOOD PRESSURE: 130 MMHG | WEIGHT: 133 LBS | DIASTOLIC BLOOD PRESSURE: 77 MMHG | BODY MASS INDEX: 27.8 KG/M2

## 2024-01-11 DIAGNOSIS — K21.9 GASTRO-ESOPHAGEAL REFLUX DISEASE W/OUT ESOPHAGITIS: ICD-10-CM

## 2024-01-11 DIAGNOSIS — R41.3 OTHER AMNESIA: ICD-10-CM

## 2024-01-11 PROCEDURE — 99214 OFFICE O/P EST MOD 30 MIN: CPT

## 2024-01-11 NOTE — HISTORY OF PRESENT ILLNESS
[de-identified] : COMES FOR F/U ;OFFERS NO NEW COMPLAINS ;SPORADIC VERTIGO ,SELF LIMITED  AS PER DAUGHTER MORE HESITANT TO WALK

## 2024-01-11 NOTE — PHYSICAL EXAM
[No Acute Distress] : no acute distress [Normal Sclera/Conjunctiva] : normal sclera/conjunctiva [Normal Outer Ear/Nose] : the outer ears and nose were normal in appearance [No JVD] : no jugular venous distention [Normal] : normal rate, regular rhythm, normal S1 and S2 and no murmur heard [No Edema] : there was no peripheral edema [Soft] : abdomen soft [Non Tender] : non-tender [Normal Bowel Sounds] : normal bowel sounds [Normal Anterior Cervical Nodes] : no anterior cervical lymphadenopathy [No CVA Tenderness] : no CVA  tenderness [Normal Affect] : the affect was normal

## 2024-01-11 NOTE — ASSESSMENT
[FreeTextEntry1] : 73 Y OLD FEM WITH PMX FO DM ,ON TRULICITY 4.5 MG WEELY FOR LAST 3 M AS PER ENDO = LABS  DYSLIPIDEMIA AND HTN = SAME MEDS  RTO 3 M

## 2024-01-12 LAB
ALBUMIN SERPL ELPH-MCNC: 4.6 G/DL
ALP BLD-CCNC: 71 U/L
ALT SERPL-CCNC: 14 U/L
ANION GAP SERPL CALC-SCNC: 12 MMOL/L
AST SERPL-CCNC: 18 U/L
BILIRUB SERPL-MCNC: 0.4 MG/DL
BUN SERPL-MCNC: 7 MG/DL
CALCIUM SERPL-MCNC: 9.9 MG/DL
CHLORIDE SERPL-SCNC: 97 MMOL/L
CHOLEST SERPL-MCNC: 175 MG/DL
CO2 SERPL-SCNC: 28 MMOL/L
CREAT SERPL-MCNC: 0.62 MG/DL
EGFR: 93 ML/MIN/1.73M2
ESTIMATED AVERAGE GLUCOSE: 174 MG/DL
GLUCOSE SERPL-MCNC: 139 MG/DL
HBA1C MFR BLD HPLC: 7.7 %
HDLC SERPL-MCNC: 71 MG/DL
LDLC SERPL CALC-MCNC: 77 MG/DL
NONHDLC SERPL-MCNC: 105 MG/DL
POTASSIUM SERPL-SCNC: 4.5 MMOL/L
PROT SERPL-MCNC: 6.9 G/DL
SODIUM SERPL-SCNC: 136 MMOL/L
TRIGL SERPL-MCNC: 166 MG/DL

## 2024-02-26 ENCOUNTER — APPOINTMENT (OUTPATIENT)
Dept: PULMONOLOGY | Facility: CLINIC | Age: 74
End: 2024-02-26
Payer: MEDICARE

## 2024-02-26 ENCOUNTER — APPOINTMENT (OUTPATIENT)
Dept: UROGYNECOLOGY | Facility: CLINIC | Age: 74
End: 2024-02-26
Payer: MEDICARE

## 2024-02-26 VITALS
SYSTOLIC BLOOD PRESSURE: 122 MMHG | TEMPERATURE: 97.3 F | WEIGHT: 134 LBS | OXYGEN SATURATION: 95 % | BODY MASS INDEX: 28.13 KG/M2 | HEIGHT: 58 IN | HEART RATE: 88 BPM | RESPIRATION RATE: 14 BRPM | DIASTOLIC BLOOD PRESSURE: 77 MMHG

## 2024-02-26 DIAGNOSIS — Z86.39 PERSONAL HISTORY OF OTHER ENDOCRINE, NUTRITIONAL AND METABOLIC DISEASE: ICD-10-CM

## 2024-02-26 DIAGNOSIS — Z86.79 PERSONAL HISTORY OF OTHER DISEASES OF THE CIRCULATORY SYSTEM: ICD-10-CM

## 2024-02-26 DIAGNOSIS — Z87.891 PERSONAL HISTORY OF NICOTINE DEPENDENCE: ICD-10-CM

## 2024-02-26 DIAGNOSIS — R06.00 DYSPNEA, UNSPECIFIED: ICD-10-CM

## 2024-02-26 DIAGNOSIS — Z84.1 FAMILY HISTORY OF DISORDERS OF KIDNEY AND URETER: ICD-10-CM

## 2024-02-26 DIAGNOSIS — R05.3 CHRONIC COUGH: ICD-10-CM

## 2024-02-26 DIAGNOSIS — J45.909 UNSPECIFIED ASTHMA, UNCOMPLICATED: ICD-10-CM

## 2024-02-26 DIAGNOSIS — Z83.3 FAMILY HISTORY OF DIABETES MELLITUS: ICD-10-CM

## 2024-02-26 LAB
BILIRUB UR QL STRIP: NORMAL
CLARITY UR: CLEAR
COLLECTION METHOD: NORMAL
GLUCOSE UR-MCNC: NORMAL
HCG UR QL: 0.2 EU/DL
HGB UR QL STRIP.AUTO: NORMAL
KETONES UR-MCNC: NORMAL
LEUKOCYTE ESTERASE UR QL STRIP: NORMAL
NITRITE UR QL STRIP: NORMAL
PH UR STRIP: 7
PROT UR STRIP-MCNC: NORMAL
SP GR UR STRIP: 1.01

## 2024-02-26 PROCEDURE — 99213 OFFICE O/P EST LOW 20 MIN: CPT | Mod: 25

## 2024-02-26 PROCEDURE — 51701 INSERT BLADDER CATHETER: CPT

## 2024-02-26 PROCEDURE — 99213 OFFICE O/P EST LOW 20 MIN: CPT

## 2024-02-26 RX ORDER — CYCLOSPORINE 0.5 MG/ML
0.05 EMULSION OPHTHALMIC TWICE DAILY
Qty: 1 | Refills: 1 | Status: ACTIVE | COMMUNITY
Start: 2022-12-29 | End: 1900-01-01

## 2024-02-26 NOTE — PROCEDURE
[Straight Catheterization] : insertion of a straight catheter [Hematuria] : hematuria [Patient] : the patient [Allergies Reviewed] : Allergies reviewed [___ Fr Straight Tip] : a [unfilled] in [place] straight tip catheter [Clear] : clear [Culture] : culture [Urinalysis] : urinalysis

## 2024-02-26 NOTE — DISCUSSION/SUMMARY
[FreeTextEntry1] : #Chronic dysuria - Will follow-up UA/UCx, if persistent AMH, will obtain CT urogram.  - Given smoking history, and persistent suprapubic pain with urination, recommended cystoscopy to r/o mucosal abnormalities/lesions. - Continue vaginal estrogen therapy for possible GUSM component. - RTO for CYSTO.

## 2024-02-26 NOTE — HISTORY OF PRESENT ILLNESS
[Former] : former [Never] : never [TextBox_4] : Patient is a 73-year-old female past medical history significant for asthma who presents today for follow-up.  Patient states her cough and shortness of breath have improved. She currently denies fevers chills chest pain weight loss or hemoptysis.

## 2024-02-26 NOTE — ASSESSMENT
[FreeTextEntry1] : In summary patient is a 73-year-old female past medical history significant for asthma diabetes hypertension cervical radiculopathy who presents today for follow-up.  The patient's physical exam is significant for improved air entry bilaterally.  Prescription renewal performed.  Patient instructed to continue current medications and follow-up in 3 months. Patient instructed to remain on levothyroxine 100 mcg/day.  Losartan hydrochlorothiazide 50/12.5 daily for hypertension.  Metformin 500 mg daily for diabetes.  Patient instructed to remain on Trelegy.

## 2024-02-26 NOTE — HISTORY OF PRESENT ILLNESS
[FreeTextEntry1] : Shruthi presents for follow up of urinary complaints. At last visit, she reported urinary frequency and urgency, as well as burning sensation and discomfort with urination. AFFIRM was positive for candidiasis, which was treated. UA was also notable for AMH (RBCs 3/HPF). Pt reports that vaginal burning/itching resolved after treatment of yeast, however she has persistent suprapubic pain with urination.  On further questioning today, patient reports a history of smoking 2-3 packs per day for 15-20 years. She quit 40 years ago. No h/o gross hematuria.

## 2024-02-28 ENCOUNTER — NON-APPOINTMENT (OUTPATIENT)
Age: 74
End: 2024-02-28

## 2024-02-28 LAB
APPEARANCE: CLEAR
BACTERIA UR CULT: NORMAL
BACTERIA: NEGATIVE /HPF
BILIRUBIN URINE: NEGATIVE
BLOOD URINE: NEGATIVE
CAST: 4 /LPF
COLOR: YELLOW
EPITHELIAL CELLS: 0 /HPF
GLUCOSE QUALITATIVE U: NEGATIVE MG/DL
KETONES URINE: NEGATIVE MG/DL
LEUKOCYTE ESTERASE URINE: NEGATIVE
MICROSCOPIC-UA: NORMAL
NITRITE URINE: NEGATIVE
PH URINE: 7
PROTEIN URINE: NEGATIVE MG/DL
RED BLOOD CELLS URINE: 2 /HPF
REVIEW: NORMAL
SPECIFIC GRAVITY URINE: 1.02
UROBILINOGEN URINE: 0.2 MG/DL
WHITE BLOOD CELLS URINE: 0 /HPF

## 2024-03-06 ENCOUNTER — OFFICE (OUTPATIENT)
Dept: URBAN - METROPOLITAN AREA CLINIC 35 | Facility: CLINIC | Age: 74
Setting detail: OPHTHALMOLOGY
End: 2024-03-06
Payer: MEDICARE

## 2024-03-06 DIAGNOSIS — E11.3293: ICD-10-CM

## 2024-03-06 DIAGNOSIS — H01.004: ICD-10-CM

## 2024-03-06 DIAGNOSIS — H26.491: ICD-10-CM

## 2024-03-06 DIAGNOSIS — H01.001: ICD-10-CM

## 2024-03-06 PROCEDURE — 92250 FUNDUS PHOTOGRAPHY W/I&R: CPT | Performed by: OPHTHALMOLOGY

## 2024-03-06 PROCEDURE — 92014 COMPRE OPH EXAM EST PT 1/>: CPT | Performed by: OPHTHALMOLOGY

## 2024-03-06 ASSESSMENT — LID POSITION - DERMATOCHALASIS
OS_DERMATOCHALASIS: LUL 3+
OD_DERMATOCHALASIS: RUL 3+

## 2024-03-06 ASSESSMENT — REFRACTION_CURRENTRX
OS_SPHERE: +1.25
OS_OVR_VA: 20/
OD_OVR_VA: 20/
OD_SPHERE: +1.25

## 2024-03-06 ASSESSMENT — LID EXAM ASSESSMENTS
OD_BLEPHARITIS: RUL 2+
OS_BLEPHARITIS: LUL 2+

## 2024-03-11 ENCOUNTER — OFFICE (OUTPATIENT)
Dept: URBAN - METROPOLITAN AREA CLINIC 32 | Facility: CLINIC | Age: 74
Setting detail: OPHTHALMOLOGY
End: 2024-03-11
Payer: MEDICARE

## 2024-03-11 DIAGNOSIS — E11.3292: ICD-10-CM

## 2024-03-11 DIAGNOSIS — E11.3211: ICD-10-CM

## 2024-03-11 PROBLEM — H10.45 ALLERGIC CONJUNCTIVITIS: Status: ACTIVE | Noted: 2024-03-06

## 2024-03-11 PROBLEM — Z96.1 PSEUDOPHAKIA: Status: ACTIVE | Noted: 2024-03-06

## 2024-03-11 PROBLEM — H35.362 DRUSEN; LEFT EYE: Status: ACTIVE | Noted: 2024-03-06

## 2024-03-11 PROBLEM — H26.491 POSTERIOR CAPSULAR OPACIFICATION; RIGHT EYE: Status: ACTIVE | Noted: 2024-03-06

## 2024-03-11 PROBLEM — H52.4 PRESBYOPIA: Status: ACTIVE | Noted: 2024-03-06

## 2024-03-11 PROBLEM — H01.001 BLEPHARITIS; RIGHT UPPER LID, LEFT UPPER LID,: Status: ACTIVE | Noted: 2024-03-06

## 2024-03-11 PROBLEM — H17.9 CORNEAL SCAR: Status: ACTIVE | Noted: 2024-03-06

## 2024-03-11 PROBLEM — H01.004 BLEPHARITIS; RIGHT UPPER LID, LEFT UPPER LID,: Status: ACTIVE | Noted: 2024-03-06

## 2024-03-11 PROCEDURE — 92201 OPSCPY EXTND RTA DRAW UNI/BI: CPT | Performed by: OPHTHALMOLOGY

## 2024-03-11 PROCEDURE — 92235 FLUORESCEIN ANGRPH MLTIFRAME: CPT | Performed by: OPHTHALMOLOGY

## 2024-03-11 PROCEDURE — 67210 TREATMENT OF RETINAL LESION: CPT | Mod: RT | Performed by: OPHTHALMOLOGY

## 2024-03-11 ASSESSMENT — LID POSITION - DERMATOCHALASIS
OS_DERMATOCHALASIS: LUL 3+
OD_DERMATOCHALASIS: RUL 3+

## 2024-03-11 ASSESSMENT — LID EXAM ASSESSMENTS
OS_BLEPHARITIS: LUL 2+
OD_BLEPHARITIS: RUL 2+

## 2024-03-21 ENCOUNTER — APPOINTMENT (OUTPATIENT)
Dept: UROGYNECOLOGY | Facility: CLINIC | Age: 74
End: 2024-03-21

## 2024-04-11 ENCOUNTER — NON-APPOINTMENT (OUTPATIENT)
Age: 74
End: 2024-04-11

## 2024-04-11 ENCOUNTER — APPOINTMENT (OUTPATIENT)
Dept: INTERNAL MEDICINE | Facility: CLINIC | Age: 74
End: 2024-04-11
Payer: MEDICARE

## 2024-04-11 ENCOUNTER — LABORATORY RESULT (OUTPATIENT)
Age: 74
End: 2024-04-11

## 2024-04-11 VITALS
SYSTOLIC BLOOD PRESSURE: 125 MMHG | HEIGHT: 58 IN | HEART RATE: 80 BPM | OXYGEN SATURATION: 96 % | DIASTOLIC BLOOD PRESSURE: 87 MMHG | WEIGHT: 136 LBS | RESPIRATION RATE: 19 BRPM | TEMPERATURE: 97.3 F | BODY MASS INDEX: 28.55 KG/M2

## 2024-04-11 DIAGNOSIS — Z00.00 ENCOUNTER FOR GENERAL ADULT MEDICAL EXAMINATION W/OUT ABNORMAL FINDINGS: ICD-10-CM

## 2024-04-11 DIAGNOSIS — M25.531 PAIN IN RIGHT WRIST: ICD-10-CM

## 2024-04-11 PROCEDURE — 93000 ELECTROCARDIOGRAM COMPLETE: CPT

## 2024-04-11 PROCEDURE — 99397 PER PM REEVAL EST PAT 65+ YR: CPT

## 2024-04-11 PROCEDURE — 99213 OFFICE O/P EST LOW 20 MIN: CPT

## 2024-04-11 RX ORDER — ATORVASTATIN CALCIUM 20 MG/1
20 TABLET, FILM COATED ORAL
Qty: 90 | Refills: 1 | Status: ACTIVE | COMMUNITY
Start: 1900-01-01 | End: 1900-01-01

## 2024-04-11 RX ORDER — SULFAMETHOXAZOLE AND TRIMETHOPRIM 800; 160 MG/1; MG/1
800-160 TABLET ORAL TWICE DAILY
Qty: 20 | Refills: 0 | Status: DISCONTINUED | COMMUNITY
Start: 2023-09-06 | End: 2024-04-11

## 2024-04-11 RX ORDER — LANCETS 33 GAUGE
EACH MISCELLANEOUS
Qty: 6 | Refills: 3 | Status: DISCONTINUED | COMMUNITY
Start: 2022-10-26 | End: 2024-04-11

## 2024-04-11 RX ORDER — DICLOFENAC SODIUM 1% 10 MG/G
1 GEL TOPICAL
Qty: 1 | Refills: 1 | Status: DISCONTINUED | COMMUNITY
Start: 2021-04-05 | End: 2024-04-11

## 2024-04-11 RX ORDER — DILTIAZEM HYDROCHLORIDE 120 MG/1
120 CAPSULE, EXTENDED RELEASE ORAL
Qty: 90 | Refills: 1 | Status: ACTIVE | COMMUNITY
Start: 2021-04-02 | End: 1900-01-01

## 2024-04-11 RX ORDER — BLOOD-GLUCOSE METER
70 EACH MISCELLANEOUS
Qty: 4 | Refills: 1 | Status: DISCONTINUED | COMMUNITY
Start: 2021-11-05 | End: 2024-04-11

## 2024-04-11 RX ORDER — FLUCONAZOLE 150 MG/1
150 TABLET ORAL
Qty: 1 | Refills: 0 | Status: DISCONTINUED | COMMUNITY
Start: 2023-12-29 | End: 2024-04-11

## 2024-04-11 RX ORDER — GABAPENTIN 100 MG/1
100 CAPSULE ORAL
Qty: 180 | Refills: 1 | Status: ACTIVE | COMMUNITY
Start: 2022-09-29 | End: 1900-01-01

## 2024-04-11 NOTE — PHYSICAL EXAM
[No Acute Distress] : no acute distress [Normal Sclera/Conjunctiva] : normal sclera/conjunctiva [Normal Outer Ear/Nose] : the outer ears and nose were normal in appearance [Normal Rhythm/Effort] : normal respiratory rhythm and effort [Decreased Breath Sounds] : breath sounds were decreased diffusely [Normal to Percussion] : the lungs were normal to percussion [Normal] : palpation of the chest was normal [Regular Rhythm] : with a regular rhythm [No Edema] : there was no peripheral edema [Soft] : abdomen soft [Non Tender] : non-tender [Normal Bowel Sounds] : normal bowel sounds [No CVA Tenderness] : no CVA  tenderness [No Rash] : no rash [Coordination Grossly Intact] : coordination grossly intact [No Focal Deficits] : no focal deficits [Alert and Oriented x3] : oriented to person, place, and time

## 2024-04-11 NOTE — HISTORY OF PRESENT ILLNESS
[de-identified] : COMES FOR CPE WITH DAUGHTER  CC OF R WRIST PAIN FOR OVER 1 M ,NO RECOLECTION OF TRAUMA WILL SEE ENDO NEXT WEEK  NEEDS MEDS  CC OF PERSISTENT REYES ;SEEN BY PULM OVER 1 M AGO BUT RX DOES NOT HELP ;NO COUGH

## 2024-04-11 NOTE — ASSESSMENT
[FreeTextEntry1] : CPE OF 73 Y OLD FEM WITH PMX OF DM = ENDO NEXT WEEK  REYES = SEEN BY PULM ;CARDIO EVAL  R WRIST DEFORMITY AND ARTHRALGIA = XR AND BRACE ORDERED RTO 4 M

## 2024-04-11 NOTE — HEALTH RISK ASSESSMENT
[Fair] :  ~his/her~ mood as fair [No] : No [No falls in past year] : Patient reported no falls in the past year [0] : 2) Feeling down, depressed, or hopeless: Not at all (0) [PHQ-2 Negative - No further assessment needed] : PHQ-2 Negative - No further assessment needed [Patient reported mammogram was normal] : Patient reported mammogram was normal [Patient reported PAP Smear was normal] : Patient reported PAP Smear was normal [Alone] : lives alone [Retired] : retired [High School] : high school [] :  [Feels Safe at Home] : Feels safe at home [Smoke Detector] : smoke detector [Carbon Monoxide Detector] : carbon monoxide detector [Seat Belt] :  uses seat belt [Sunscreen] : uses sunscreen [Never] : Never [de-identified] : pt admits to trying to stay physically active  [de-identified] : pt admits trying to eat an overall healthy diet [[id number]] : 0 [Sexually Active] : not sexually active [Reports changes in hearing] : Reports no changes in hearing [Reports changes in vision] : Reports no changes in vision [Reports changes in dental health] : Reports no changes in dental health [Guns at Home] : no guns at home [MammogramDate] : [id number] [PapSmearDate] : 02/2024

## 2024-04-12 LAB
25(OH)D3 SERPL-MCNC: 22.1 NG/ML
ALBUMIN SERPL ELPH-MCNC: 3.9 G/DL
ALP BLD-CCNC: 66 U/L
ALT SERPL-CCNC: 24 U/L
ANION GAP SERPL CALC-SCNC: 14 MMOL/L
APPEARANCE: CLEAR
AST SERPL-CCNC: 32 U/L
BILIRUB SERPL-MCNC: 0.2 MG/DL
BILIRUBIN URINE: NEGATIVE
BLOOD URINE: NEGATIVE
BUN SERPL-MCNC: 5 MG/DL
CALCIUM SERPL-MCNC: 9.4 MG/DL
CHLORIDE SERPL-SCNC: 100 MMOL/L
CHOLEST SERPL-MCNC: 148 MG/DL
CO2 SERPL-SCNC: 21 MMOL/L
COLOR: YELLOW
CREAT SERPL-MCNC: 0.49 MG/DL
CREAT SPEC-SCNC: 46 MG/DL
EGFR: 98 ML/MIN/1.73M2
ESTIMATED AVERAGE GLUCOSE: 171 MG/DL
GLUCOSE QUALITATIVE U: NEGATIVE MG/DL
GLUCOSE SERPL-MCNC: 133 MG/DL
HBA1C MFR BLD HPLC: 7.6 %
HCT VFR BLD CALC: 39.1 %
HDLC SERPL-MCNC: 64 MG/DL
HGB BLD-MCNC: 12.1 G/DL
KETONES URINE: NEGATIVE MG/DL
LDLC SERPL CALC-MCNC: 60 MG/DL
LEUKOCYTE ESTERASE URINE: NEGATIVE
MCHC RBC-ENTMCNC: 26.8 PG
MCHC RBC-ENTMCNC: 30.9 GM/DL
MCV RBC AUTO: 86.7 FL
MICROALBUMIN 24H UR DL<=1MG/L-MCNC: <1.2 MG/DL
MICROALBUMIN/CREAT 24H UR-RTO: NORMAL MG/G
NITRITE URINE: NEGATIVE
NONHDLC SERPL-MCNC: 84 MG/DL
PH URINE: 7
PLATELET # BLD AUTO: 242 K/UL
POTASSIUM SERPL-SCNC: 4.5 MMOL/L
PROT SERPL-MCNC: 6.4 G/DL
PROTEIN URINE: NEGATIVE MG/DL
RBC # BLD: 4.51 M/UL
RBC # FLD: 14.3 %
SODIUM SERPL-SCNC: 135 MMOL/L
SPECIFIC GRAVITY URINE: 1.01
TRIGL SERPL-MCNC: 140 MG/DL
TSH SERPL-ACNC: 0.1 UIU/ML
UROBILINOGEN URINE: 0.2 MG/DL
VIT B12 SERPL-MCNC: 976 PG/ML
WBC # FLD AUTO: 6.74 K/UL

## 2024-04-15 ENCOUNTER — APPOINTMENT (OUTPATIENT)
Dept: ENDOCRINOLOGY | Facility: CLINIC | Age: 74
End: 2024-04-15
Payer: MEDICARE

## 2024-04-15 VITALS
SYSTOLIC BLOOD PRESSURE: 112 MMHG | HEIGHT: 58 IN | WEIGHT: 133 LBS | OXYGEN SATURATION: 96 % | BODY MASS INDEX: 27.92 KG/M2 | HEART RATE: 84 BPM | DIASTOLIC BLOOD PRESSURE: 78 MMHG

## 2024-04-15 DIAGNOSIS — E78.5 HYPERLIPIDEMIA, UNSPECIFIED: ICD-10-CM

## 2024-04-15 DIAGNOSIS — E03.9 HYPOTHYROIDISM, UNSPECIFIED: ICD-10-CM

## 2024-04-15 DIAGNOSIS — I10 ESSENTIAL (PRIMARY) HYPERTENSION: ICD-10-CM

## 2024-04-15 PROCEDURE — 99215 OFFICE O/P EST HI 40 MIN: CPT

## 2024-04-15 PROCEDURE — 95251 CONT GLUC MNTR ANALYSIS I&R: CPT

## 2024-04-15 RX ORDER — LEVOTHYROXINE SODIUM 0.1 MG/1
100 TABLET ORAL DAILY
Qty: 90 | Refills: 0 | Status: ACTIVE | COMMUNITY
Start: 2021-08-24 | End: 1900-01-01

## 2024-04-15 RX ORDER — FLASH GLUCOSE SENSOR
KIT MISCELLANEOUS
Qty: 2 | Refills: 4 | Status: ACTIVE | COMMUNITY
Start: 2021-10-26 | End: 1900-01-01

## 2024-04-15 RX ORDER — MONTELUKAST 10 MG/1
10 TABLET, FILM COATED ORAL DAILY
Qty: 90 | Refills: 0 | Status: ACTIVE | COMMUNITY
Start: 2021-08-24 | End: 1900-01-01

## 2024-04-15 RX ORDER — LANCING DEVICE
EACH MISCELLANEOUS
Qty: 3 | Refills: 4 | Status: ACTIVE | COMMUNITY
Start: 2024-04-15 | End: 1900-01-01

## 2024-04-15 RX ORDER — ROPINIROLE 1 MG/1
1 TABLET, FILM COATED ORAL
Qty: 90 | Refills: 0 | Status: ACTIVE | COMMUNITY
Start: 2021-02-03 | End: 1900-01-01

## 2024-04-15 RX ORDER — LOSARTAN POTASSIUM AND HYDROCHLOROTHIAZIDE 12.5; 5 MG/1; MG/1
50-12.5 TABLET ORAL
Qty: 90 | Refills: 1 | Status: ACTIVE | COMMUNITY
Start: 2023-03-30 | End: 1900-01-01

## 2024-04-15 RX ORDER — LANCING DEVICE
EACH MISCELLANEOUS
Qty: 1 | Refills: 1 | Status: ACTIVE | COMMUNITY
Start: 2024-04-15 | End: 1900-01-01

## 2024-04-17 ENCOUNTER — APPOINTMENT (OUTPATIENT)
Dept: UROGYNECOLOGY | Facility: CLINIC | Age: 74
End: 2024-04-17
Payer: MEDICARE

## 2024-04-17 VITALS
HEIGHT: 58 IN | WEIGHT: 133 LBS | SYSTOLIC BLOOD PRESSURE: 152 MMHG | BODY MASS INDEX: 27.92 KG/M2 | HEART RATE: 82 BPM | DIASTOLIC BLOOD PRESSURE: 84 MMHG

## 2024-04-17 DIAGNOSIS — R39.89 OTHER SYMPTOMS AND SIGNS INVOLVING THE GENITOURINARY SYSTEM: ICD-10-CM

## 2024-04-17 PROCEDURE — 81003 URINALYSIS AUTO W/O SCOPE: CPT | Mod: QW

## 2024-04-17 PROCEDURE — 99214 OFFICE O/P EST MOD 30 MIN: CPT

## 2024-04-17 NOTE — PHYSICAL EXAM
[Vulvar Atrophy] : vulvar atrophy [Atrophy] : atrophy [Dry Mucosa] : dry mucosa [Normal] : normal [FreeTextEntry4] : +tenderness with palpation of anterior vaginal wall [de-identified] : +suprapubic discomfort

## 2024-04-17 NOTE — HISTORY OF PRESENT ILLNESS
[FreeTextEntry1] : Pt presents for follow up. She reports persistent dysuria/suprapubic pain. She was scheduled for cystoscopy, which she cancelled as she was in too much pain at the time.

## 2024-04-17 NOTE — DISCUSSION/SUMMARY
[FreeTextEntry1] : #Chronic dysuria - Urinalysis negative pyuria or hematuria. Last urine culture was also negative despite persistent of patient's symptoms, therefore low suspicion for UTI and culture not sent today.  - I again explained rationale for cystoscopy given patient persistent symptoms/history and absence of UTI. - Also recommend CTU to r/o pelvic pathology given pelvic pain noted on exam today. - Pt takes Gabapentin for neuropathy, discussed trial of an increased dose at nighttime. Advised to return to current dose if experiencing any adverse side effects.  - RTO for cystoscopy.

## 2024-04-18 ENCOUNTER — NON-APPOINTMENT (OUTPATIENT)
Age: 74
End: 2024-04-18

## 2024-04-18 LAB
CANDIDA VAG CYTO: NOT DETECTED
G VAGINALIS+PREV SP MTYP VAG QL MICRO: NOT DETECTED
T VAGINALIS VAG QL WET PREP: NOT DETECTED

## 2024-04-24 ENCOUNTER — RESULT REVIEW (OUTPATIENT)
Age: 74
End: 2024-04-24

## 2024-04-29 ENCOUNTER — APPOINTMENT (OUTPATIENT)
Dept: CT IMAGING | Facility: IMAGING CENTER | Age: 74
End: 2024-04-29
Payer: MEDICARE

## 2024-04-29 ENCOUNTER — OUTPATIENT (OUTPATIENT)
Dept: OUTPATIENT SERVICES | Facility: HOSPITAL | Age: 74
LOS: 1 days | End: 2024-04-29
Payer: COMMERCIAL

## 2024-04-29 DIAGNOSIS — R30.0 DYSURIA: ICD-10-CM

## 2024-04-29 PROCEDURE — 74178 CT ABD&PLV WO CNTR FLWD CNTR: CPT

## 2024-04-29 PROCEDURE — 74178 CT ABD&PLV WO CNTR FLWD CNTR: CPT | Mod: 26

## 2024-05-05 ENCOUNTER — NON-APPOINTMENT (OUTPATIENT)
Age: 74
End: 2024-05-05

## 2024-05-08 ENCOUNTER — OUTPATIENT (OUTPATIENT)
Dept: OUTPATIENT SERVICES | Facility: HOSPITAL | Age: 74
LOS: 1 days | End: 2024-05-08
Payer: COMMERCIAL

## 2024-05-08 ENCOUNTER — APPOINTMENT (OUTPATIENT)
Dept: UROGYNECOLOGY | Facility: CLINIC | Age: 74
End: 2024-05-08
Payer: MEDICARE

## 2024-05-08 DIAGNOSIS — E27.8 OTHER SPECIFIED DISORDERS OF ADRENAL GLAND: ICD-10-CM

## 2024-05-08 DIAGNOSIS — Z01.818 ENCOUNTER FOR OTHER PREPROCEDURAL EXAMINATION: ICD-10-CM

## 2024-05-08 PROCEDURE — 52000 CYSTOURETHROSCOPY: CPT

## 2024-05-08 RX ORDER — PHENAZOPYRIDINE 200 MG/1
200 TABLET, FILM COATED ORAL 3 TIMES DAILY
Qty: 21 | Refills: 0 | Status: ACTIVE | COMMUNITY
Start: 2024-05-08 | End: 1900-01-01

## 2024-05-09 ENCOUNTER — NON-APPOINTMENT (OUTPATIENT)
Age: 74
End: 2024-05-09

## 2024-05-24 ENCOUNTER — APPOINTMENT (OUTPATIENT)
Dept: UROLOGY | Facility: CLINIC | Age: 74
End: 2024-05-24
Payer: MEDICARE

## 2024-05-24 VITALS
TEMPERATURE: 98 F | OXYGEN SATURATION: 95 % | WEIGHT: 133 LBS | BODY MASS INDEX: 27.92 KG/M2 | HEART RATE: 79 BPM | DIASTOLIC BLOOD PRESSURE: 70 MMHG | HEIGHT: 58 IN | RESPIRATION RATE: 19 BRPM | SYSTOLIC BLOOD PRESSURE: 107 MMHG

## 2024-05-24 DIAGNOSIS — N32.9 BLADDER DISORDER, UNSPECIFIED: ICD-10-CM

## 2024-05-24 PROCEDURE — 99213 OFFICE O/P EST LOW 20 MIN: CPT

## 2024-05-24 PROCEDURE — G2211 COMPLEX E/M VISIT ADD ON: CPT

## 2024-05-28 ENCOUNTER — TRANSCRIPTION ENCOUNTER (OUTPATIENT)
Age: 74
End: 2024-05-28

## 2024-05-28 LAB — URINE CYTOLOGY: NORMAL

## 2024-05-28 RX ORDER — METFORMIN ER 500 MG 500 MG/1
500 TABLET ORAL
Qty: 180 | Refills: 2 | Status: ACTIVE | COMMUNITY
Start: 2022-07-25 | End: 1900-01-01

## 2024-05-28 RX ORDER — FLASH GLUCOSE SCANNING READER
EACH MISCELLANEOUS
Qty: 1 | Refills: 3 | Status: ACTIVE | COMMUNITY
Start: 2021-10-26 | End: 1900-01-01

## 2024-05-28 NOTE — HISTORY OF PRESENT ILLNESS
[FreeTextEntry1] : She is a 74-year-old woman who is seen today with her daughter.  Recently she has been having suprapubic and pain in the urethral area which happens sometimes with urination and sometimes without it.  There is no hematuria.  She saw her urogynecologist.  CT scan in April 2024 was normal except for 3 cm adrenal mass for which she is scheduled for an MRI.  Urinalysis and urine culture in February 2024 were normal.  She is a non-smoker.  Pyridium helped somewhat and she was given estrogen vaginal cream.  She underwent cystoscopy which showed a few small nodules, fatty deposits at the level of the trigone and she is here for further evaluation.

## 2024-05-28 NOTE — PHYSICAL EXAM
[General Appearance - Well Developed] : well developed [General Appearance - Well Nourished] : well nourished [Normal Appearance] : normal appearance [Well Groomed] : well groomed [General Appearance - In No Acute Distress] : no acute distress [] : no respiratory distress [Exaggerated Use Of Accessory Muscles For Inspiration] : no accessory muscle use [Oriented To Time, Place, And Person] : oriented to person, place, and time [Affect] : the affect was normal [Mood] : the mood was normal [Not Anxious] : not anxious [de-identified] : Using a cane

## 2024-05-28 NOTE — ASSESSMENT
[FreeTextEntry1] : Potential differential diagnosis for bladder lesions was discussed.  She could continue with Pyridium or Azo over-the-counter which helped her with dysuria.  She is scheduled for MRI in the near future for her adrenal mass.  She will repeat cystoscopy in a few weeks and if her lesions persist then we will proceed with bladder biopsy.  Urine culture was negative.  Urine cytology will be sent.

## 2024-06-03 ENCOUNTER — APPOINTMENT (OUTPATIENT)
Dept: MRI IMAGING | Facility: IMAGING CENTER | Age: 74
End: 2024-06-03
Payer: MEDICARE

## 2024-06-03 ENCOUNTER — RESULT REVIEW (OUTPATIENT)
Age: 74
End: 2024-06-03

## 2024-06-03 ENCOUNTER — OUTPATIENT (OUTPATIENT)
Dept: OUTPATIENT SERVICES | Facility: HOSPITAL | Age: 74
LOS: 1 days | End: 2024-06-03
Payer: COMMERCIAL

## 2024-06-03 DIAGNOSIS — E27.8 OTHER SPECIFIED DISORDERS OF ADRENAL GLAND: ICD-10-CM

## 2024-06-03 PROCEDURE — 74183 MRI ABD W/O CNTR FLWD CNTR: CPT

## 2024-06-03 PROCEDURE — A9585: CPT

## 2024-06-03 PROCEDURE — 74183 MRI ABD W/O CNTR FLWD CNTR: CPT | Mod: 26

## 2024-06-10 ENCOUNTER — APPOINTMENT (OUTPATIENT)
Dept: CARDIOLOGY | Facility: CLINIC | Age: 74
End: 2024-06-10
Payer: MEDICARE

## 2024-06-10 ENCOUNTER — NON-APPOINTMENT (OUTPATIENT)
Age: 74
End: 2024-06-10

## 2024-06-10 VITALS
HEART RATE: 73 BPM | HEIGHT: 58 IN | OXYGEN SATURATION: 96 % | WEIGHT: 132 LBS | BODY MASS INDEX: 27.71 KG/M2 | RESPIRATION RATE: 16 BRPM | SYSTOLIC BLOOD PRESSURE: 118 MMHG | DIASTOLIC BLOOD PRESSURE: 77 MMHG

## 2024-06-10 DIAGNOSIS — E11.49 TYPE 2 DIABETES MELLITUS WITH OTHER DIABETIC NEUROLOGICAL COMPLICATION: ICD-10-CM

## 2024-06-10 DIAGNOSIS — I35.0 NONRHEUMATIC AORTIC (VALVE) STENOSIS: ICD-10-CM

## 2024-06-10 PROCEDURE — G2211 COMPLEX E/M VISIT ADD ON: CPT

## 2024-06-10 PROCEDURE — 99213 OFFICE O/P EST LOW 20 MIN: CPT

## 2024-06-10 PROCEDURE — 93000 ELECTROCARDIOGRAM COMPLETE: CPT

## 2024-06-10 NOTE — DISCUSSION/SUMMARY
[FreeTextEntry1] : The patient is a 74-year-old Bengali speaking female DM, HTN, HLD with REYES.  #1 CV- normal ECG, ECHO to f/u on AS  #2 HTN- c/w diltiazem 180mg, losartan 50/12.5mg #3 HLD- c/w atorvastatin 20mg and fenofibrate #4 DM- c/w basaglar, trulicity, metformin 500mg bid #5 Hypothyroid- c/w levothyroxine #6 Asthma- c/w trelegy #7 General- encouraged to walk daily, daughter supportive [EKG obtained to assist in diagnosis and management of assessed problem(s)] : EKG obtained to assist in diagnosis and management of assessed problem(s)

## 2024-06-10 NOTE — HISTORY OF PRESENT ILLNESS
[FreeTextEntry1] : Shruthi has been more SOB on minimal exertion. She has kyphosis which may be contributing. Also waiting for thyroid tests.

## 2024-06-11 LAB
NT-PROBNP SERPL-MCNC: 85 PG/ML
T3FREE SERPL-MCNC: 2.51 PG/ML
T4 FREE SERPL-MCNC: 1.8 NG/DL
TSH SERPL-ACNC: 0.19 UIU/ML

## 2024-06-12 ENCOUNTER — NON-APPOINTMENT (OUTPATIENT)
Age: 74
End: 2024-06-12

## 2024-06-24 ENCOUNTER — APPOINTMENT (OUTPATIENT)
Dept: UROLOGY | Facility: CLINIC | Age: 74
End: 2024-06-24
Payer: MEDICARE

## 2024-06-24 DIAGNOSIS — R30.0 DYSURIA: ICD-10-CM

## 2024-06-24 PROCEDURE — 52000 CYSTOURETHROSCOPY: CPT

## 2024-06-24 RX ORDER — DULAGLUTIDE 4.5 MG/.5ML
4.5 INJECTION, SOLUTION SUBCUTANEOUS
Qty: 3 | Refills: 2 | Status: ACTIVE | COMMUNITY
Start: 2021-10-26 | End: 1900-01-01

## 2024-06-25 ENCOUNTER — OFFICE (OUTPATIENT)
Dept: URBAN - METROPOLITAN AREA CLINIC 35 | Facility: CLINIC | Age: 74
Setting detail: OPHTHALMOLOGY
End: 2024-06-25
Payer: MEDICARE

## 2024-06-25 ENCOUNTER — RX ONLY (RX ONLY)
Age: 74
End: 2024-06-25

## 2024-06-25 DIAGNOSIS — H01.004: ICD-10-CM

## 2024-06-25 DIAGNOSIS — H01.001: ICD-10-CM

## 2024-06-25 DIAGNOSIS — H10.45: ICD-10-CM

## 2024-06-25 DIAGNOSIS — Z96.1: ICD-10-CM

## 2024-06-25 DIAGNOSIS — H16.223: ICD-10-CM

## 2024-06-25 DIAGNOSIS — H02.825: ICD-10-CM

## 2024-06-25 PROCEDURE — 92012 INTRM OPH EXAM EST PATIENT: CPT | Performed by: OPHTHALMOLOGY

## 2024-06-25 ASSESSMENT — LID EXAM ASSESSMENTS
OD_BLEPHARITIS: RUL 2+
OS_BLEPHARITIS: LUL 2+

## 2024-06-25 ASSESSMENT — LID POSITION - DERMATOCHALASIS
OD_DERMATOCHALASIS: RUL 3+
OS_DERMATOCHALASIS: LUL 3+

## 2024-07-08 ENCOUNTER — APPOINTMENT (OUTPATIENT)
Dept: CARDIOLOGY | Facility: CLINIC | Age: 74
End: 2024-07-08
Payer: MEDICARE

## 2024-07-08 PROCEDURE — 93306 TTE W/DOPPLER COMPLETE: CPT

## 2024-08-22 ENCOUNTER — NON-APPOINTMENT (OUTPATIENT)
Age: 74
End: 2024-08-22

## 2024-08-23 ENCOUNTER — APPOINTMENT (OUTPATIENT)
Dept: INTERNAL MEDICINE | Facility: CLINIC | Age: 74
End: 2024-08-23
Payer: MEDICARE

## 2024-08-23 VITALS
TEMPERATURE: 97.5 F | HEIGHT: 58 IN | DIASTOLIC BLOOD PRESSURE: 76 MMHG | HEART RATE: 90 BPM | BODY MASS INDEX: 26.87 KG/M2 | OXYGEN SATURATION: 97 % | SYSTOLIC BLOOD PRESSURE: 113 MMHG | WEIGHT: 128 LBS | RESPIRATION RATE: 16 BRPM

## 2024-08-23 DIAGNOSIS — E78.5 HYPERLIPIDEMIA, UNSPECIFIED: ICD-10-CM

## 2024-08-23 DIAGNOSIS — I35.0 NONRHEUMATIC AORTIC (VALVE) STENOSIS: ICD-10-CM

## 2024-08-23 DIAGNOSIS — I10 ESSENTIAL (PRIMARY) HYPERTENSION: ICD-10-CM

## 2024-08-23 DIAGNOSIS — E03.9 HYPOTHYROIDISM, UNSPECIFIED: ICD-10-CM

## 2024-08-23 DIAGNOSIS — E11.49 TYPE 2 DIABETES MELLITUS WITH OTHER DIABETIC NEUROLOGICAL COMPLICATION: ICD-10-CM

## 2024-08-23 LAB
ALBUMIN SERPL ELPH-MCNC: 4.3 G/DL
ALP BLD-CCNC: 62 U/L
ALT SERPL-CCNC: 12 U/L
ANION GAP SERPL CALC-SCNC: 17 MMOL/L
AST SERPL-CCNC: 17 U/L
BILIRUB SERPL-MCNC: 0.5 MG/DL
BUN SERPL-MCNC: 5 MG/DL
CALCIUM SERPL-MCNC: 9.8 MG/DL
CHLORIDE SERPL-SCNC: 95 MMOL/L
CHOLEST SERPL-MCNC: 153 MG/DL
CO2 SERPL-SCNC: 24 MMOL/L
CREAT SERPL-MCNC: 0.66 MG/DL
EGFR: 91 ML/MIN/1.73M2
ESTIMATED AVERAGE GLUCOSE: 151 MG/DL
GLUCOSE SERPL-MCNC: 110 MG/DL
HBA1C MFR BLD HPLC: 6.9 %
HDLC SERPL-MCNC: 72 MG/DL
LDLC SERPL CALC-MCNC: 56 MG/DL
NONHDLC SERPL-MCNC: 81 MG/DL
POTASSIUM SERPL-SCNC: 3.9 MMOL/L
PROT SERPL-MCNC: 6.5 G/DL
SODIUM SERPL-SCNC: 136 MMOL/L
TRIGL SERPL-MCNC: 147 MG/DL

## 2024-08-23 PROCEDURE — 99214 OFFICE O/P EST MOD 30 MIN: CPT

## 2024-08-23 NOTE — ASSESSMENT
[FreeTextEntry1] : 74 Y OLD FEM WITH PMX OF HTN ,DM ,DYSLIPIDEMIA = LABS AND MEDS ORDERED BPPV = DISCUSSED  AS= AS PER CARDIOLOGY  HYPOTHYROID = TSH RECENTLY DONE BY CARDIOLOGY = STABLE  RTO 3-4 M

## 2024-08-23 NOTE — HISTORY OF PRESENT ILLNESS
[de-identified] : COMES FOR 4 M F/U ;SEEN BY ENDO AND CARDIO SINCE LAST VISIT ,NO CHANGE IN MEDS ;ECHO = STABLE AS  BSFS 110-180 ON TRULICITY 4.5 MG DOSE LAST 3 M

## 2024-08-23 NOTE — PHYSICAL EXAM
[No Acute Distress] : no acute distress [Normal Sclera/Conjunctiva] : normal sclera/conjunctiva [Normal Outer Ear/Nose] : the outer ears and nose were normal in appearance [Normal] : no respiratory distress, lungs were clear to auscultation bilaterally and no accessory muscle use [Normal Rate] : normal rate  [Regular Rhythm] : with a regular rhythm [No Edema] : there was no peripheral edema [Soft] : abdomen soft [Non Tender] : non-tender [Normal Bowel Sounds] : normal bowel sounds [Normal Anterior Cervical Nodes] : no anterior cervical lymphadenopathy [No Rash] : no rash [Coordination Grossly Intact] : coordination grossly intact [No Focal Deficits] : no focal deficits [Alert and Oriented x3] : oriented to person, place, and time [de-identified] : LEIDA 2/6 RUSB

## 2024-08-25 ENCOUNTER — NON-APPOINTMENT (OUTPATIENT)
Age: 74
End: 2024-08-25

## 2024-09-16 ENCOUNTER — DOCTOR'S OFFICE (OUTPATIENT)
Facility: LOCATION | Age: 74
Setting detail: OPHTHALMOLOGY
End: 2024-09-16
Payer: MEDICARE

## 2024-09-16 ENCOUNTER — APPOINTMENT (OUTPATIENT)
Dept: PULMONOLOGY | Facility: CLINIC | Age: 74
End: 2024-09-16
Payer: MEDICARE

## 2024-09-16 VITALS
BODY MASS INDEX: 26.87 KG/M2 | SYSTOLIC BLOOD PRESSURE: 116 MMHG | WEIGHT: 128 LBS | DIASTOLIC BLOOD PRESSURE: 74 MMHG | OXYGEN SATURATION: 96 % | HEIGHT: 58 IN | HEART RATE: 89 BPM | RESPIRATION RATE: 19 BRPM

## 2024-09-16 DIAGNOSIS — I35.0 NONRHEUMATIC AORTIC (VALVE) STENOSIS: ICD-10-CM

## 2024-09-16 DIAGNOSIS — Z87.891 PERSONAL HISTORY OF NICOTINE DEPENDENCE: ICD-10-CM

## 2024-09-16 DIAGNOSIS — R06.00 DYSPNEA, UNSPECIFIED: ICD-10-CM

## 2024-09-16 DIAGNOSIS — J45.909 UNSPECIFIED ASTHMA, UNCOMPLICATED: ICD-10-CM

## 2024-09-16 DIAGNOSIS — H35.362: ICD-10-CM

## 2024-09-16 DIAGNOSIS — E11.3211: ICD-10-CM

## 2024-09-16 DIAGNOSIS — K21.9 GASTRO-ESOPHAGEAL REFLUX DISEASE W/OUT ESOPHAGITIS: ICD-10-CM

## 2024-09-16 DIAGNOSIS — E11.3292: ICD-10-CM

## 2024-09-16 PROCEDURE — G0008: CPT

## 2024-09-16 PROCEDURE — 99214 OFFICE O/P EST MOD 30 MIN: CPT | Mod: 25

## 2024-09-16 PROCEDURE — 92012 INTRM OPH EXAM EST PATIENT: CPT | Performed by: OPHTHALMOLOGY

## 2024-09-16 PROCEDURE — 90662 IIV NO PRSV INCREASED AG IM: CPT

## 2024-09-16 PROCEDURE — 92134 CPTRZ OPH DX IMG PST SGM RTA: CPT | Performed by: OPHTHALMOLOGY

## 2024-09-16 NOTE — PHYSICAL EXAM
[No Acute Distress] : no acute distress [Normal Oropharynx] : normal oropharynx [Normal Appearance] : normal appearance [No Neck Mass] : no neck mass [Normal Rate/Rhythm] : normal rate/rhythm [Normal S1, S2] : normal s1, s2 [No Murmurs] : no murmurs [No Resp Distress] : no resp distress [Clear to Auscultation Bilaterally] : clear to auscultation bilaterally [No Abnormalities] : no abnormalities [Benign] : benign [Normal Gait] : normal gait [No Cyanosis] : no cyanosis [No Clubbing] : no clubbing [No Edema] : no edema [Normal Color/ Pigmentation] : normal color/ pigmentation [No Focal Deficits] : no focal deficits [Oriented x3] : oriented x3 [Normal Affect] : normal affect

## 2024-09-16 NOTE — HISTORY OF PRESENT ILLNESS
[Former] : former [Never] : never [TextBox_4] :  73-year-old female past medical history significant for asthma, aortic stenosis, GERD, DM,

## 2024-09-16 NOTE — ASSESSMENT
[FreeTextEntry1] : 73-year-old female past medical history significant for asthma, diabetes, hypertension, cervical radiculopathy, Aortic stenosis who presents today for follow-up.  Reports seeing Dr. Akers recently, had TTE done, continues to follow with her for known aortic stenosis. BP meds changed, no longer having cough. Has shortness of breath which she states is at baseline.  On exam, lungs are clear, no wheezing or rhonchi, in NAD.  Care plans discussed - will order for  Trelegy (100mcg/62.5mcg/25 mcg) 1 puff Daily for management of her Asthma, given in office Flu vaccine. Follow-up in 3 months.

## 2024-11-05 ENCOUNTER — APPOINTMENT (OUTPATIENT)
Dept: ENDOCRINOLOGY | Facility: CLINIC | Age: 74
End: 2024-11-05

## 2024-11-08 ENCOUNTER — NON-APPOINTMENT (OUTPATIENT)
Age: 74
End: 2024-11-08

## 2024-11-08 ENCOUNTER — APPOINTMENT (OUTPATIENT)
Dept: ENDOCRINOLOGY | Facility: CLINIC | Age: 74
End: 2024-11-08
Payer: MEDICARE

## 2024-11-08 VITALS
SYSTOLIC BLOOD PRESSURE: 130 MMHG | HEIGHT: 58 IN | WEIGHT: 129 LBS | HEART RATE: 88 BPM | OXYGEN SATURATION: 98 % | DIASTOLIC BLOOD PRESSURE: 80 MMHG | BODY MASS INDEX: 27.08 KG/M2

## 2024-11-08 DIAGNOSIS — E27.8 OTHER SPECIFIED DISORDERS OF ADRENAL GLAND: ICD-10-CM

## 2024-11-08 DIAGNOSIS — I10 ESSENTIAL (PRIMARY) HYPERTENSION: ICD-10-CM

## 2024-11-08 DIAGNOSIS — E78.5 HYPERLIPIDEMIA, UNSPECIFIED: ICD-10-CM

## 2024-11-08 DIAGNOSIS — Z13.820 ENCOUNTER FOR SCREENING FOR OSTEOPOROSIS: ICD-10-CM

## 2024-11-08 DIAGNOSIS — E11.49 TYPE 2 DIABETES MELLITUS WITH OTHER DIABETIC NEUROLOGICAL COMPLICATION: ICD-10-CM

## 2024-11-08 DIAGNOSIS — E03.9 HYPOTHYROIDISM, UNSPECIFIED: ICD-10-CM

## 2024-11-08 LAB
GLUCOSE BLDC GLUCOMTR-MCNC: 110
HBA1C MFR BLD HPLC: 7.1

## 2024-11-08 PROCEDURE — 83036 HEMOGLOBIN GLYCOSYLATED A1C: CPT | Mod: QW

## 2024-11-08 PROCEDURE — 99215 OFFICE O/P EST HI 40 MIN: CPT

## 2024-11-08 PROCEDURE — 82962 GLUCOSE BLOOD TEST: CPT

## 2024-11-08 PROCEDURE — 95251 CONT GLUC MNTR ANALYSIS I&R: CPT

## 2024-11-08 RX ORDER — DEXAMETHASONE 1 MG/1
1 TABLET ORAL DAILY
Qty: 1 | Refills: 0 | Status: ACTIVE | COMMUNITY
Start: 2024-11-08 | End: 1900-01-01

## 2024-11-15 LAB
ACTH SER-ACNC: 1.8 PG/ML
ANION GAP SERPL CALC-SCNC: 14 MMOL/L
BUN SERPL-MCNC: 8 MG/DL
CALCIUM SERPL-MCNC: 9.8 MG/DL
CHLORIDE SERPL-SCNC: 98 MMOL/L
CO2 SERPL-SCNC: 25 MMOL/L
CORTIS SERPL-MCNC: 2.2 UG/DL
CREAT SERPL-MCNC: 0.55 MG/DL
EGFR: 95 ML/MIN/1.73M2
GLUCOSE SERPL-MCNC: 190 MG/DL
POTASSIUM SERPL-SCNC: 4.4 MMOL/L
SODIUM SERPL-SCNC: 137 MMOL/L

## 2024-12-27 ENCOUNTER — APPOINTMENT (OUTPATIENT)
Dept: INTERNAL MEDICINE | Facility: CLINIC | Age: 74
End: 2024-12-27
Payer: MEDICARE

## 2024-12-27 VITALS
HEART RATE: 84 BPM | HEIGHT: 58 IN | SYSTOLIC BLOOD PRESSURE: 132 MMHG | DIASTOLIC BLOOD PRESSURE: 71 MMHG | TEMPERATURE: 97.6 F | OXYGEN SATURATION: 96 % | RESPIRATION RATE: 19 BRPM | BODY MASS INDEX: 26.24 KG/M2 | WEIGHT: 125 LBS

## 2024-12-27 DIAGNOSIS — E03.9 HYPOTHYROIDISM, UNSPECIFIED: ICD-10-CM

## 2024-12-27 DIAGNOSIS — E78.5 HYPERLIPIDEMIA, UNSPECIFIED: ICD-10-CM

## 2024-12-27 DIAGNOSIS — I10 ESSENTIAL (PRIMARY) HYPERTENSION: ICD-10-CM

## 2024-12-27 DIAGNOSIS — R35.1 NOCTURIA: ICD-10-CM

## 2024-12-27 DIAGNOSIS — J45.909 UNSPECIFIED ASTHMA, UNCOMPLICATED: ICD-10-CM

## 2024-12-27 DIAGNOSIS — E11.49 TYPE 2 DIABETES MELLITUS WITH OTHER DIABETIC NEUROLOGICAL COMPLICATION: ICD-10-CM

## 2024-12-27 DIAGNOSIS — R20.0 ANESTHESIA OF SKIN: ICD-10-CM

## 2024-12-27 DIAGNOSIS — G25.81 RESTLESS LEGS SYNDROME: ICD-10-CM

## 2024-12-27 PROCEDURE — 99214 OFFICE O/P EST MOD 30 MIN: CPT

## 2025-01-02 ENCOUNTER — RX RENEWAL (OUTPATIENT)
Age: 75
End: 2025-01-02

## 2025-01-29 RX ORDER — INSULIN GLARGINE-YFGN 100 [IU]/ML
100 INJECTION, SOLUTION SUBCUTANEOUS
Qty: 1 | Refills: 0 | Status: ACTIVE | COMMUNITY
Start: 2025-01-29 | End: 1900-01-01

## 2025-02-03 ENCOUNTER — APPOINTMENT (OUTPATIENT)
Dept: PULMONOLOGY | Facility: CLINIC | Age: 75
End: 2025-02-03
Payer: MEDICARE

## 2025-02-03 VITALS
OXYGEN SATURATION: 97 % | DIASTOLIC BLOOD PRESSURE: 79 MMHG | RESPIRATION RATE: 18 BRPM | BODY MASS INDEX: 26.03 KG/M2 | HEART RATE: 101 BPM | WEIGHT: 124 LBS | HEIGHT: 58 IN | SYSTOLIC BLOOD PRESSURE: 121 MMHG

## 2025-02-03 DIAGNOSIS — K21.9 GASTRO-ESOPHAGEAL REFLUX DISEASE W/OUT ESOPHAGITIS: ICD-10-CM

## 2025-02-03 DIAGNOSIS — R05.3 CHRONIC COUGH: ICD-10-CM

## 2025-02-03 DIAGNOSIS — J45.909 UNSPECIFIED ASTHMA, UNCOMPLICATED: ICD-10-CM

## 2025-02-03 PROCEDURE — 99214 OFFICE O/P EST MOD 30 MIN: CPT

## 2025-02-28 ENCOUNTER — APPOINTMENT (OUTPATIENT)
Dept: MRI IMAGING | Facility: CLINIC | Age: 75
End: 2025-02-28

## 2025-02-28 ENCOUNTER — APPOINTMENT (OUTPATIENT)
Dept: ENDOCRINOLOGY | Facility: CLINIC | Age: 75
End: 2025-02-28

## 2025-03-21 ENCOUNTER — RX RENEWAL (OUTPATIENT)
Age: 75
End: 2025-03-21

## 2025-04-25 ENCOUNTER — APPOINTMENT (OUTPATIENT)
Dept: INTERNAL MEDICINE | Facility: CLINIC | Age: 75
End: 2025-04-25

## 2025-05-05 ENCOUNTER — APPOINTMENT (OUTPATIENT)
Dept: PULMONOLOGY | Facility: CLINIC | Age: 75
End: 2025-05-05

## 2025-07-23 NOTE — HISTORY OF PRESENT ILLNESS
no edema, no murmurs, regular rate and rhythm [de-identified] : COMES FOR F/U \par SEEN BY ENDO AND TOLD TO STOP FENOFIBRATE \par SEEN BY ENT AND TOLD TO STOP OMEPRAZOLE \par PERSISTENT NOCTURNAL DRY COUGH